# Patient Record
Sex: MALE | Race: WHITE | Employment: UNEMPLOYED | URBAN - METROPOLITAN AREA
[De-identification: names, ages, dates, MRNs, and addresses within clinical notes are randomized per-mention and may not be internally consistent; named-entity substitution may affect disease eponyms.]

---

## 2023-09-12 ENCOUNTER — APPOINTMENT (OUTPATIENT)
Dept: CT IMAGING | Age: 48
DRG: 364 | End: 2023-09-12
Payer: MEDICAID

## 2023-09-12 ENCOUNTER — APPOINTMENT (OUTPATIENT)
Dept: GENERAL RADIOLOGY | Age: 48
DRG: 364 | End: 2023-09-12
Payer: MEDICAID

## 2023-09-12 ENCOUNTER — HOSPITAL ENCOUNTER (EMERGENCY)
Age: 48
Discharge: ANOTHER HOSPITAL | DRG: 364 | End: 2023-09-12
Attending: EMERGENCY MEDICINE
Payer: MEDICAID

## 2023-09-12 ENCOUNTER — ANESTHESIA (OUTPATIENT)
Dept: OPERATING ROOM | Age: 48
End: 2023-09-12
Payer: MEDICAID

## 2023-09-12 ENCOUNTER — ANESTHESIA EVENT (OUTPATIENT)
Dept: OPERATING ROOM | Age: 48
End: 2023-09-12
Payer: MEDICAID

## 2023-09-12 ENCOUNTER — HOSPITAL ENCOUNTER (INPATIENT)
Age: 48
LOS: 10 days | Discharge: HOME | DRG: 364 | End: 2023-09-22
Attending: EMERGENCY MEDICINE | Admitting: INTERNAL MEDICINE
Payer: MEDICAID

## 2023-09-12 VITALS
OXYGEN SATURATION: 98 % | DIASTOLIC BLOOD PRESSURE: 88 MMHG | HEIGHT: 72 IN | WEIGHT: 95 LBS | SYSTOLIC BLOOD PRESSURE: 117 MMHG | BODY MASS INDEX: 12.87 KG/M2 | HEART RATE: 108 BPM | TEMPERATURE: 98.3 F | RESPIRATION RATE: 22 BRPM

## 2023-09-12 DIAGNOSIS — K12.2 SUBMANDIBULAR ABSCESS: ICD-10-CM

## 2023-09-12 DIAGNOSIS — J93.11 PRIMARY SPONTANEOUS PNEUMOTHORAX: Primary | ICD-10-CM

## 2023-09-12 DIAGNOSIS — J93.83 SPONTANEOUS PNEUMOTHORAX: Primary | ICD-10-CM

## 2023-09-12 DIAGNOSIS — F10.930 ALCOHOL WITHDRAWAL SYNDROME WITHOUT COMPLICATION (HCC): ICD-10-CM

## 2023-09-12 DIAGNOSIS — L02.11 NECK ABSCESS: ICD-10-CM

## 2023-09-12 PROBLEM — K02.9 DENTAL CAVITIES: Status: ACTIVE | Noted: 2023-09-12

## 2023-09-12 PROBLEM — I10 HYPERTENSION: Status: ACTIVE | Noted: 2023-09-12

## 2023-09-12 PROBLEM — D72.825 BANDEMIA: Status: ACTIVE | Noted: 2023-09-12

## 2023-09-12 PROBLEM — E87.6 HYPOKALEMIA: Status: ACTIVE | Noted: 2023-09-12

## 2023-09-12 PROBLEM — D72.823 LEUKEMOID REACTION: Status: ACTIVE | Noted: 2023-09-12

## 2023-09-12 PROBLEM — E87.1 HYPONATREMIA: Status: ACTIVE | Noted: 2023-09-12

## 2023-09-12 PROBLEM — K04.7 DENTAL INFECTION: Status: ACTIVE | Noted: 2023-09-12

## 2023-09-12 PROBLEM — F10.20 ALCOHOL DEPENDENCE (HCC): Chronic | Status: ACTIVE | Noted: 2023-09-12

## 2023-09-12 PROBLEM — F41.9 ANXIETY: Chronic | Status: ACTIVE | Noted: 2023-09-12

## 2023-09-12 PROBLEM — J93.9 PNEUMOTHORAX ON RIGHT: Status: ACTIVE | Noted: 2023-09-12

## 2023-09-12 PROBLEM — E87.8 HYPOCHLOREMIA: Status: ACTIVE | Noted: 2023-09-12

## 2023-09-12 PROBLEM — J44.9 COPD (CHRONIC OBSTRUCTIVE PULMONARY DISEASE) (HCC): Chronic | Status: ACTIVE | Noted: 2023-09-12

## 2023-09-12 LAB
ALBUMIN SERPL-MCNC: 3.5 G/DL (ref 3.5–5.2)
ALP SERPL-CCNC: 123 U/L (ref 40–129)
ALT SERPL-CCNC: 11 U/L (ref 5–41)
ANION GAP SERPL CALCULATED.3IONS-SCNC: 13 MMOL/L (ref 9–17)
ANION GAP SERPL CALCULATED.3IONS-SCNC: 7 MMOL/L (ref 9–17)
AST SERPL-CCNC: 20 U/L
BASOPHILS # BLD: 0.07 K/UL (ref 0–0.2)
BASOPHILS NFR BLD: 1 % (ref 0–2)
BILIRUB DIRECT SERPL-MCNC: 0.3 MG/DL
BILIRUB INDIRECT SERPL-MCNC: 0.6 MG/DL (ref 0–1)
BILIRUB SERPL-MCNC: 0.9 MG/DL (ref 0.3–1.2)
BUN SERPL-MCNC: 7 MG/DL (ref 6–20)
BUN SERPL-MCNC: 7 MG/DL (ref 6–20)
BUN/CREAT SERPL: 12 (ref 9–20)
CALCIUM SERPL-MCNC: 8.5 MG/DL (ref 8.6–10.4)
CALCIUM SERPL-MCNC: 9.8 MG/DL (ref 8.6–10.4)
CHLORIDE SERPL-SCNC: 94 MMOL/L (ref 98–107)
CHLORIDE SERPL-SCNC: 99 MMOL/L (ref 98–107)
CO2 SERPL-SCNC: 27 MMOL/L (ref 20–31)
CO2 SERPL-SCNC: 29 MMOL/L (ref 20–31)
CREAT SERPL-MCNC: 0.5 MG/DL (ref 0.7–1.2)
CREAT SERPL-MCNC: 0.6 MG/DL (ref 0.7–1.2)
EOSINOPHIL # BLD: 0.19 K/UL (ref 0–0.44)
EOSINOPHILS RELATIVE PERCENT: 1 % (ref 1–4)
ERYTHROCYTE [DISTWIDTH] IN BLOOD BY AUTOMATED COUNT: 12.3 % (ref 11.8–14.4)
GFR SERPL CREATININE-BSD FRML MDRD: >60 ML/MIN/1.73M2
GFR SERPL CREATININE-BSD FRML MDRD: >60 ML/MIN/1.73M2
GLUCOSE SERPL-MCNC: 135 MG/DL (ref 70–99)
GLUCOSE SERPL-MCNC: 155 MG/DL (ref 70–99)
HCT VFR BLD AUTO: 50.9 % (ref 40.7–50.3)
HGB BLD-MCNC: 17.3 G/DL (ref 13–17)
IMM GRANULOCYTES # BLD AUTO: 0.09 K/UL (ref 0–0.3)
IMM GRANULOCYTES NFR BLD: 1 %
INR PPP: 1
LACTIC ACID, SEPSIS WHOLE BLOOD: 1.3 MMOL/L (ref 0.5–1.9)
LYMPHOCYTES NFR BLD: 1.33 K/UL (ref 1.1–3.7)
LYMPHOCYTES RELATIVE PERCENT: 9 % (ref 24–43)
MCH RBC QN AUTO: 33.5 PG (ref 25.2–33.5)
MCHC RBC AUTO-ENTMCNC: 34 G/DL (ref 28.4–34.8)
MCV RBC AUTO: 98.5 FL (ref 82.6–102.9)
MONOCYTES NFR BLD: 1.15 K/UL (ref 0.1–1.2)
MONOCYTES NFR BLD: 8 % (ref 3–12)
NEUTROPHILS NFR BLD: 80 % (ref 36–65)
NEUTS SEG NFR BLD: 12.53 K/UL (ref 1.5–8.1)
NRBC BLD-RTO: 0 PER 100 WBC
PARTIAL THROMBOPLASTIN TIME: 34.6 SEC (ref 23.9–33.8)
PLATELET # BLD AUTO: 298 K/UL (ref 138–453)
PMV BLD AUTO: 9.2 FL (ref 8.1–13.5)
POTASSIUM SERPL-SCNC: 3.5 MMOL/L (ref 3.7–5.3)
POTASSIUM SERPL-SCNC: 4.2 MMOL/L (ref 3.7–5.3)
PROT SERPL-MCNC: 7.5 G/DL (ref 6.4–8.3)
PROTHROMBIN TIME: 13.2 SEC (ref 11.5–14.2)
RBC # BLD AUTO: 5.17 M/UL (ref 4.21–5.77)
SARS-COV-2 RDRP RESP QL NAA+PROBE: NOT DETECTED
SODIUM SERPL-SCNC: 134 MMOL/L (ref 135–144)
SODIUM SERPL-SCNC: 135 MMOL/L (ref 135–144)
SPECIMEN DESCRIPTION: NORMAL
TROPONIN I SERPL HS-MCNC: 14 NG/L (ref 0–22)
WBC OTHER # BLD: 15.4 K/UL (ref 3.5–11.3)

## 2023-09-12 PROCEDURE — 94761 N-INVAS EAR/PLS OXIMETRY MLT: CPT

## 2023-09-12 PROCEDURE — 96365 THER/PROPH/DIAG IV INF INIT: CPT

## 2023-09-12 PROCEDURE — 71260 CT THORAX DX C+: CPT

## 2023-09-12 PROCEDURE — 7100000001 HC PACU RECOVERY - ADDTL 15 MIN: Performed by: OTOLARYNGOLOGY

## 2023-09-12 PROCEDURE — 83605 ASSAY OF LACTIC ACID: CPT

## 2023-09-12 PROCEDURE — 36415 COLL VENOUS BLD VENIPUNCTURE: CPT

## 2023-09-12 PROCEDURE — 2500000003 HC RX 250 WO HCPCS

## 2023-09-12 PROCEDURE — 87040 BLOOD CULTURE FOR BACTERIA: CPT

## 2023-09-12 PROCEDURE — 6360000002 HC RX W HCPCS: Performed by: EMERGENCY MEDICINE

## 2023-09-12 PROCEDURE — 0W9600Z DRAINAGE OF NECK WITH DRAINAGE DEVICE, OPEN APPROACH: ICD-10-PCS | Performed by: OTOLARYNGOLOGY

## 2023-09-12 PROCEDURE — 85025 COMPLETE CBC W/AUTO DIFF WBC: CPT

## 2023-09-12 PROCEDURE — 21501 I&D DP ABSC/HMTMA SFT TS NCK: CPT | Performed by: OTOLARYNGOLOGY

## 2023-09-12 PROCEDURE — 87076 CULTURE ANAEROBE IDENT EACH: CPT

## 2023-09-12 PROCEDURE — 6360000002 HC RX W HCPCS

## 2023-09-12 PROCEDURE — 70487 CT MAXILLOFACIAL W/DYE: CPT

## 2023-09-12 PROCEDURE — 2580000003 HC RX 258

## 2023-09-12 PROCEDURE — 99285 EMERGENCY DEPT VISIT HI MDM: CPT

## 2023-09-12 PROCEDURE — 2580000003 HC RX 258: Performed by: OTOLARYNGOLOGY

## 2023-09-12 PROCEDURE — 99291 CRITICAL CARE FIRST HOUR: CPT | Performed by: INTERNAL MEDICINE

## 2023-09-12 PROCEDURE — 87185 SC STD ENZYME DETCJ PER NZM: CPT

## 2023-09-12 PROCEDURE — 2700000000 HC OXYGEN THERAPY PER DAY

## 2023-09-12 PROCEDURE — 2580000003 HC RX 258: Performed by: STUDENT IN AN ORGANIZED HEALTH CARE EDUCATION/TRAINING PROGRAM

## 2023-09-12 PROCEDURE — 3600000002 HC SURGERY LEVEL 2 BASE: Performed by: OTOLARYNGOLOGY

## 2023-09-12 PROCEDURE — 2580000003 HC RX 258: Performed by: EMERGENCY MEDICINE

## 2023-09-12 PROCEDURE — 3600000012 HC SURGERY LEVEL 2 ADDTL 15MIN: Performed by: OTOLARYNGOLOGY

## 2023-09-12 PROCEDURE — 87635 SARS-COV-2 COVID-19 AMP PRB: CPT

## 2023-09-12 PROCEDURE — 85610 PROTHROMBIN TIME: CPT

## 2023-09-12 PROCEDURE — 32551 INSERTION OF CHEST TUBE: CPT

## 2023-09-12 PROCEDURE — 87641 MR-STAPH DNA AMP PROBE: CPT

## 2023-09-12 PROCEDURE — 2000000000 HC ICU R&B

## 2023-09-12 PROCEDURE — 2709999900 HC NON-CHARGEABLE SUPPLY: Performed by: OTOLARYNGOLOGY

## 2023-09-12 PROCEDURE — 87075 CULTR BACTERIA EXCEPT BLOOD: CPT

## 2023-09-12 PROCEDURE — 94640 AIRWAY INHALATION TREATMENT: CPT

## 2023-09-12 PROCEDURE — 6360000004 HC RX CONTRAST MEDICATION: Performed by: EMERGENCY MEDICINE

## 2023-09-12 PROCEDURE — 87070 CULTURE OTHR SPECIMN AEROBIC: CPT

## 2023-09-12 PROCEDURE — 85730 THROMBOPLASTIN TIME PARTIAL: CPT

## 2023-09-12 PROCEDURE — 3700000001 HC ADD 15 MINUTES (ANESTHESIA): Performed by: OTOLARYNGOLOGY

## 2023-09-12 PROCEDURE — 93005 ELECTROCARDIOGRAM TRACING: CPT | Performed by: EMERGENCY MEDICINE

## 2023-09-12 PROCEDURE — 84484 ASSAY OF TROPONIN QUANT: CPT

## 2023-09-12 PROCEDURE — 93005 ELECTROCARDIOGRAM TRACING: CPT | Performed by: STUDENT IN AN ORGANIZED HEALTH CARE EDUCATION/TRAINING PROGRAM

## 2023-09-12 PROCEDURE — 80048 BASIC METABOLIC PNL TOTAL CA: CPT

## 2023-09-12 PROCEDURE — 96375 TX/PRO/DX INJ NEW DRUG ADDON: CPT

## 2023-09-12 PROCEDURE — 96376 TX/PRO/DX INJ SAME DRUG ADON: CPT

## 2023-09-12 PROCEDURE — 99254 IP/OBS CNSLTJ NEW/EST MOD 60: CPT | Performed by: OTOLARYNGOLOGY

## 2023-09-12 PROCEDURE — 96361 HYDRATE IV INFUSION ADD-ON: CPT

## 2023-09-12 PROCEDURE — 7100000000 HC PACU RECOVERY - FIRST 15 MIN: Performed by: OTOLARYNGOLOGY

## 2023-09-12 PROCEDURE — 6370000000 HC RX 637 (ALT 250 FOR IP)

## 2023-09-12 PROCEDURE — 71045 X-RAY EXAM CHEST 1 VIEW: CPT

## 2023-09-12 PROCEDURE — 6360000002 HC RX W HCPCS: Performed by: STUDENT IN AN ORGANIZED HEALTH CARE EDUCATION/TRAINING PROGRAM

## 2023-09-12 PROCEDURE — 87205 SMEAR GRAM STAIN: CPT

## 2023-09-12 PROCEDURE — 3700000000 HC ANESTHESIA ATTENDED CARE: Performed by: OTOLARYNGOLOGY

## 2023-09-12 PROCEDURE — 80076 HEPATIC FUNCTION PANEL: CPT

## 2023-09-12 PROCEDURE — 2500000003 HC RX 250 WO HCPCS: Performed by: STUDENT IN AN ORGANIZED HEALTH CARE EDUCATION/TRAINING PROGRAM

## 2023-09-12 PROCEDURE — 96367 TX/PROPH/DG ADDL SEQ IV INF: CPT

## 2023-09-12 RX ORDER — SODIUM CHLORIDE 9 MG/ML
INJECTION, SOLUTION INTRAVENOUS CONTINUOUS
Status: DISCONTINUED | OUTPATIENT
Start: 2023-09-12 | End: 2023-09-13

## 2023-09-12 RX ORDER — ONDANSETRON 2 MG/ML
4 INJECTION INTRAMUSCULAR; INTRAVENOUS EVERY 6 HOURS PRN
Status: DISCONTINUED | OUTPATIENT
Start: 2023-09-12 | End: 2023-09-22 | Stop reason: HOSPADM

## 2023-09-12 RX ORDER — SODIUM CHLORIDE 0.9 % (FLUSH) 0.9 %
10 SYRINGE (ML) INJECTION PRN
Status: DISCONTINUED | OUTPATIENT
Start: 2023-09-12 | End: 2023-09-12 | Stop reason: HOSPADM

## 2023-09-12 RX ORDER — SODIUM CHLORIDE 0.9 % (FLUSH) 0.9 %
5-40 SYRINGE (ML) INJECTION PRN
Status: DISCONTINUED | OUTPATIENT
Start: 2023-09-12 | End: 2023-09-22 | Stop reason: HOSPADM

## 2023-09-12 RX ORDER — SODIUM CHLORIDE 9 MG/ML
INJECTION, SOLUTION INTRAVENOUS CONTINUOUS PRN
Status: DISCONTINUED | OUTPATIENT
Start: 2023-09-12 | End: 2023-09-12 | Stop reason: SDUPTHER

## 2023-09-12 RX ORDER — METHYLPREDNISOLONE SODIUM SUCCINATE 125 MG/2ML
125 INJECTION, POWDER, LYOPHILIZED, FOR SOLUTION INTRAMUSCULAR; INTRAVENOUS ONCE
Status: COMPLETED | OUTPATIENT
Start: 2023-09-12 | End: 2023-09-12

## 2023-09-12 RX ORDER — ONDANSETRON 2 MG/ML
4 INJECTION INTRAMUSCULAR; INTRAVENOUS
Status: ACTIVE | OUTPATIENT
Start: 2023-09-12 | End: 2023-09-13

## 2023-09-12 RX ORDER — FENTANYL CITRATE 0.05 MG/ML
50 INJECTION, SOLUTION INTRAMUSCULAR; INTRAVENOUS ONCE
Status: COMPLETED | OUTPATIENT
Start: 2023-09-12 | End: 2023-09-12

## 2023-09-12 RX ORDER — IPRATROPIUM BROMIDE AND ALBUTEROL SULFATE 2.5; .5 MG/3ML; MG/3ML
1 SOLUTION RESPIRATORY (INHALATION)
Status: DISCONTINUED | OUTPATIENT
Start: 2023-09-12 | End: 2023-09-17

## 2023-09-12 RX ORDER — 0.9 % SODIUM CHLORIDE 0.9 %
1000 INTRAVENOUS SOLUTION INTRAVENOUS ONCE
Status: COMPLETED | OUTPATIENT
Start: 2023-09-12 | End: 2023-09-12

## 2023-09-12 RX ORDER — LORAZEPAM 2 MG/ML
1 INJECTION INTRAMUSCULAR ONCE
Status: COMPLETED | OUTPATIENT
Start: 2023-09-12 | End: 2023-09-12

## 2023-09-12 RX ORDER — RISPERIDONE 0.25 MG/1
TABLET ORAL 2 TIMES DAILY
COMMUNITY

## 2023-09-12 RX ORDER — TRAZODONE HYDROCHLORIDE 100 MG/1
TABLET ORAL NIGHTLY
COMMUNITY

## 2023-09-12 RX ORDER — LIDOCAINE HYDROCHLORIDE 10 MG/ML
INJECTION, SOLUTION EPIDURAL; INFILTRATION; INTRACAUDAL; PERINEURAL PRN
Status: DISCONTINUED | OUTPATIENT
Start: 2023-09-12 | End: 2023-09-12 | Stop reason: SDUPTHER

## 2023-09-12 RX ORDER — SODIUM CHLORIDE 9 MG/ML
INJECTION, SOLUTION INTRAVENOUS PRN
Status: DISCONTINUED | OUTPATIENT
Start: 2023-09-12 | End: 2023-09-22 | Stop reason: HOSPADM

## 2023-09-12 RX ORDER — 0.9 % SODIUM CHLORIDE 0.9 %
80 INTRAVENOUS SOLUTION INTRAVENOUS ONCE
Status: COMPLETED | OUTPATIENT
Start: 2023-09-12 | End: 2023-09-12

## 2023-09-12 RX ORDER — MIDAZOLAM HYDROCHLORIDE 1 MG/ML
INJECTION INTRAMUSCULAR; INTRAVENOUS PRN
Status: DISCONTINUED | OUTPATIENT
Start: 2023-09-12 | End: 2023-09-12 | Stop reason: SDUPTHER

## 2023-09-12 RX ORDER — POLYETHYLENE GLYCOL 3350 17 G/17G
17 POWDER, FOR SOLUTION ORAL DAILY PRN
Status: DISCONTINUED | OUTPATIENT
Start: 2023-09-12 | End: 2023-09-22 | Stop reason: HOSPADM

## 2023-09-12 RX ORDER — ACETAMINOPHEN 650 MG/1
650 SUPPOSITORY RECTAL EVERY 6 HOURS PRN
Status: DISCONTINUED | OUTPATIENT
Start: 2023-09-12 | End: 2023-09-22 | Stop reason: HOSPADM

## 2023-09-12 RX ORDER — DEXAMETHASONE SODIUM PHOSPHATE 10 MG/ML
INJECTION INTRAMUSCULAR; INTRAVENOUS PRN
Status: DISCONTINUED | OUTPATIENT
Start: 2023-09-12 | End: 2023-09-12 | Stop reason: SDUPTHER

## 2023-09-12 RX ORDER — MAGNESIUM SULFATE IN WATER 40 MG/ML
2000 INJECTION, SOLUTION INTRAVENOUS PRN
Status: DISCONTINUED | OUTPATIENT
Start: 2023-09-12 | End: 2023-09-22 | Stop reason: HOSPADM

## 2023-09-12 RX ORDER — METRONIDAZOLE 500 MG/100ML
500 INJECTION, SOLUTION INTRAVENOUS ONCE
Status: COMPLETED | OUTPATIENT
Start: 2023-09-12 | End: 2023-09-12

## 2023-09-12 RX ORDER — POTASSIUM CHLORIDE 29.8 MG/ML
20 INJECTION INTRAVENOUS PRN
Status: DISCONTINUED | OUTPATIENT
Start: 2023-09-12 | End: 2023-09-14

## 2023-09-12 RX ORDER — MAGNESIUM HYDROXIDE 1200 MG/15ML
LIQUID ORAL CONTINUOUS PRN
Status: COMPLETED | OUTPATIENT
Start: 2023-09-12 | End: 2023-09-12

## 2023-09-12 RX ORDER — SODIUM CHLORIDE 0.9 % (FLUSH) 0.9 %
5-40 SYRINGE (ML) INJECTION EVERY 12 HOURS SCHEDULED
Status: DISCONTINUED | OUTPATIENT
Start: 2023-09-12 | End: 2023-09-22 | Stop reason: HOSPADM

## 2023-09-12 RX ORDER — MORPHINE SULFATE 4 MG/ML
2 INJECTION, SOLUTION INTRAMUSCULAR; INTRAVENOUS EVERY 8 HOURS PRN
Status: DISCONTINUED | OUTPATIENT
Start: 2023-09-12 | End: 2023-09-22 | Stop reason: HOSPADM

## 2023-09-12 RX ORDER — CLINDAMYCIN PHOSPHATE 600 MG/50ML
600 INJECTION INTRAVENOUS ONCE
Status: COMPLETED | OUTPATIENT
Start: 2023-09-12 | End: 2023-09-12

## 2023-09-12 RX ORDER — FENTANYL CITRATE 50 UG/ML
INJECTION, SOLUTION INTRAMUSCULAR; INTRAVENOUS PRN
Status: DISCONTINUED | OUTPATIENT
Start: 2023-09-12 | End: 2023-09-12 | Stop reason: SDUPTHER

## 2023-09-12 RX ORDER — ACETAMINOPHEN 325 MG/1
650 TABLET ORAL EVERY 6 HOURS PRN
Status: DISCONTINUED | OUTPATIENT
Start: 2023-09-12 | End: 2023-09-22 | Stop reason: HOSPADM

## 2023-09-12 RX ORDER — HYDRALAZINE HYDROCHLORIDE 20 MG/ML
10 INJECTION INTRAMUSCULAR; INTRAVENOUS
Status: DISCONTINUED | OUTPATIENT
Start: 2023-09-12 | End: 2023-09-22 | Stop reason: HOSPADM

## 2023-09-12 RX ORDER — IPRATROPIUM BROMIDE AND ALBUTEROL SULFATE 2.5; .5 MG/3ML; MG/3ML
1 SOLUTION RESPIRATORY (INHALATION) ONCE
Status: DISCONTINUED | OUTPATIENT
Start: 2023-09-12 | End: 2023-09-12

## 2023-09-12 RX ORDER — ONDANSETRON 2 MG/ML
INJECTION INTRAMUSCULAR; INTRAVENOUS PRN
Status: DISCONTINUED | OUTPATIENT
Start: 2023-09-12 | End: 2023-09-12 | Stop reason: SDUPTHER

## 2023-09-12 RX ORDER — ONDANSETRON 4 MG/1
4 TABLET, ORALLY DISINTEGRATING ORAL EVERY 8 HOURS PRN
Status: DISCONTINUED | OUTPATIENT
Start: 2023-09-12 | End: 2023-09-22 | Stop reason: HOSPADM

## 2023-09-12 RX ORDER — POTASSIUM CHLORIDE 7.45 MG/ML
10 INJECTION INTRAVENOUS PRN
Status: DISCONTINUED | OUTPATIENT
Start: 2023-09-12 | End: 2023-09-14

## 2023-09-12 RX ORDER — PROPOFOL 10 MG/ML
INJECTION, EMULSION INTRAVENOUS PRN
Status: DISCONTINUED | OUTPATIENT
Start: 2023-09-12 | End: 2023-09-12 | Stop reason: SDUPTHER

## 2023-09-12 RX ADMIN — MIDAZOLAM 2 MG: 1 INJECTION INTRAMUSCULAR; INTRAVENOUS at 14:18

## 2023-09-12 RX ADMIN — THIAMINE HYDROCHLORIDE: 100 INJECTION, SOLUTION INTRAMUSCULAR; INTRAVENOUS at 13:47

## 2023-09-12 RX ADMIN — CEFTRIAXONE SODIUM 1000 MG: 1 INJECTION, POWDER, FOR SOLUTION INTRAMUSCULAR; INTRAVENOUS at 07:27

## 2023-09-12 RX ADMIN — SODIUM CHLORIDE: 9 INJECTION, SOLUTION INTRAVENOUS at 19:09

## 2023-09-12 RX ADMIN — LORAZEPAM 1 MG: 2 INJECTION INTRAMUSCULAR; INTRAVENOUS at 07:32

## 2023-09-12 RX ADMIN — FENTANYL CITRATE 25 MCG: 50 INJECTION, SOLUTION INTRAMUSCULAR; INTRAVENOUS at 14:40

## 2023-09-12 RX ADMIN — IPRATROPIUM BROMIDE AND ALBUTEROL SULFATE 1 DOSE: 2.5; .5 SOLUTION RESPIRATORY (INHALATION) at 20:28

## 2023-09-12 RX ADMIN — PROPOFOL 50 MG: 10 INJECTION, EMULSION INTRAVENOUS at 14:23

## 2023-09-12 RX ADMIN — CEFEPIME 2000 MG: 2 INJECTION, POWDER, FOR SOLUTION INTRAVENOUS at 06:19

## 2023-09-12 RX ADMIN — FENTANYL CITRATE 25 MCG: 50 INJECTION, SOLUTION INTRAMUSCULAR; INTRAVENOUS at 14:30

## 2023-09-12 RX ADMIN — DEXAMETHASONE SODIUM PHOSPHATE 10 MG: 10 INJECTION INTRAMUSCULAR; INTRAVENOUS at 14:28

## 2023-09-12 RX ADMIN — IOPAMIDOL 75 ML: 755 INJECTION, SOLUTION INTRAVENOUS at 05:51

## 2023-09-12 RX ADMIN — ONDANSETRON 4 MG: 2 INJECTION INTRAMUSCULAR; INTRAVENOUS at 14:28

## 2023-09-12 RX ADMIN — SODIUM CHLORIDE: 9 INJECTION, SOLUTION INTRAVENOUS at 18:50

## 2023-09-12 RX ADMIN — FENTANYL CITRATE 50 MCG: 50 INJECTION INTRAMUSCULAR; INTRAVENOUS at 06:31

## 2023-09-12 RX ADMIN — SODIUM CHLORIDE 1000 ML: 9 INJECTION, SOLUTION INTRAVENOUS at 05:23

## 2023-09-12 RX ADMIN — SODIUM CHLORIDE 1000 ML: 9 INJECTION, SOLUTION INTRAVENOUS at 11:49

## 2023-09-12 RX ADMIN — SODIUM CHLORIDE 80 ML: 9 INJECTION, SOLUTION INTRAVENOUS at 05:50

## 2023-09-12 RX ADMIN — METHYLPREDNISOLONE SODIUM SUCCINATE 125 MG: 125 INJECTION, POWDER, LYOPHILIZED, FOR SOLUTION INTRAMUSCULAR; INTRAVENOUS at 04:09

## 2023-09-12 RX ADMIN — LIDOCAINE HYDROCHLORIDE 50 MG: 10 INJECTION, SOLUTION EPIDURAL; INFILTRATION; INTRACAUDAL; PERINEURAL at 14:23

## 2023-09-12 RX ADMIN — CLINDAMYCIN IN 5 PERCENT DEXTROSE 600 MG: 12 INJECTION, SOLUTION INTRAVENOUS at 11:51

## 2023-09-12 RX ADMIN — AMPICILLIN SODIUM AND SULBACTAM SODIUM 3000 MG: 2; 1 INJECTION, POWDER, FOR SOLUTION INTRAMUSCULAR; INTRAVENOUS at 19:11

## 2023-09-12 RX ADMIN — VANCOMYCIN HYDROCHLORIDE 1250 MG: 1.25 INJECTION, POWDER, LYOPHILIZED, FOR SOLUTION INTRAVENOUS at 20:41

## 2023-09-12 RX ADMIN — FENTANYL CITRATE 50 MCG: 50 INJECTION INTRAMUSCULAR; INTRAVENOUS at 04:55

## 2023-09-12 RX ADMIN — SODIUM CHLORIDE: 0.9 INJECTION, SOLUTION INTRAVENOUS at 14:16

## 2023-09-12 RX ADMIN — SODIUM CHLORIDE, PRESERVATIVE FREE 10 ML: 5 INJECTION INTRAVENOUS at 05:51

## 2023-09-12 RX ADMIN — METRONIDAZOLE 500 MG: 500 INJECTION, SOLUTION INTRAVENOUS at 08:19

## 2023-09-12 ASSESSMENT — PAIN DESCRIPTION - DESCRIPTORS
DESCRIPTORS: DISCOMFORT
DESCRIPTORS: ACHING
DESCRIPTORS: DISCOMFORT
DESCRIPTORS: ACHING

## 2023-09-12 ASSESSMENT — ENCOUNTER SYMPTOMS
SORE THROAT: 1
VOICE CHANGE: 0
TROUBLE SWALLOWING: 0
ABDOMINAL PAIN: 0
CHEST TIGHTNESS: 1
FACIAL SWELLING: 1
COUGH: 1
STRIDOR: 0
NAUSEA: 1
WHEEZING: 1
NAUSEA: 0
DIARRHEA: 0
VOMITING: 0
COLOR CHANGE: 1
SHORTNESS OF BREATH: 1
COUGH: 1
FACIAL SWELLING: 1
SHORTNESS OF BREATH: 1
WHEEZING: 0

## 2023-09-12 ASSESSMENT — PAIN DESCRIPTION - PAIN TYPE
TYPE: ACUTE PAIN
TYPE: SURGICAL PAIN
TYPE: SURGICAL PAIN
TYPE: ACUTE PAIN

## 2023-09-12 ASSESSMENT — PAIN DESCRIPTION - LOCATION
LOCATION: CHEST;ABDOMEN
LOCATION: NECK
LOCATION: CHEST
LOCATION: CHEST;ABDOMEN
LOCATION: CHEST
LOCATION: NECK

## 2023-09-12 ASSESSMENT — PAIN SCALES - GENERAL
PAINLEVEL_OUTOF10: 4
PAINLEVEL_OUTOF10: 4
PAINLEVEL_OUTOF10: 6
PAINLEVEL_OUTOF10: 4
PAINLEVEL_OUTOF10: 4
PAINLEVEL_OUTOF10: 6
PAINLEVEL_OUTOF10: 0
PAINLEVEL_OUTOF10: 6
PAINLEVEL_OUTOF10: 8

## 2023-09-12 ASSESSMENT — PAIN DESCRIPTION - ORIENTATION
ORIENTATION: ANTERIOR
ORIENTATION: RIGHT;LEFT
ORIENTATION: RIGHT;LEFT
ORIENTATION: ANTERIOR

## 2023-09-12 ASSESSMENT — PAIN SCALES - WONG BAKER
WONGBAKER_NUMERICALRESPONSE: 0

## 2023-09-12 ASSESSMENT — PAIN - FUNCTIONAL ASSESSMENT
PAIN_FUNCTIONAL_ASSESSMENT: PREVENTS OR INTERFERES SOME ACTIVE ACTIVITIES AND ADLS
PAIN_FUNCTIONAL_ASSESSMENT: PREVENTS OR INTERFERES SOME ACTIVE ACTIVITIES AND ADLS
PAIN_FUNCTIONAL_ASSESSMENT: 0-10
PAIN_FUNCTIONAL_ASSESSMENT: 0-10

## 2023-09-12 ASSESSMENT — LIFESTYLE VARIABLES: SMOKING_STATUS: 1

## 2023-09-12 ASSESSMENT — PAIN DESCRIPTION - FREQUENCY
FREQUENCY: CONTINUOUS
FREQUENCY: CONTINUOUS

## 2023-09-12 NOTE — ED NOTES
Caesar San  [birth year]  50 yo M  SOB - PTX R side  Smoker, daily drinker  Chest Tube placed  Cefepime, ativan  Also L sided submandibular abscess, asking for ENT     Post Angela, RN  09/12/23 4841

## 2023-09-12 NOTE — H&P
Critical Care - History and Physical Examination    Patient's name:  Abraham Feliz Record Number: [id number]  Patient's account/billing number: [de-identified]  Patient's YOB: 1975  Age: 51 y.o. Date of Admission: 9/12/2023 10:56 AM  Reason of ICU admission:   Date of History and Physical Examination: 9/12/2023      Primary Care Physician: No primary care provider on file. Attending Physician:    Code Status: No Order    Chief complaint: Abrupt onset of SOB     Reason for ICU admission:   B/l Submandibular abscess and pneumothorax     History Of Present Illness:   History was obtained from the patient. Clay Enrique is a 51 y.o. with a PMH of COPD and anxiety. Patient presents with an abrupt onset of shortness of breath. He does have a significant history of 30 years of smoking 1/2 packs for 15 of those years. He has smoked 2 backs for the later 15 years. Patient denies recent trauma. He does have COPD for which he does take inhalers, but couldn't remember the names. States that he has had left sided swelling, tenderness and erythema under the left jaw the past two days. He was concerned about this swelling because his father had head and neck cancer diagnosed in the past. He was worried about this presentation because it seemed similar to his father. Says he does not brush his teeth often    Patient also has a history of anxiety for which he takes trazodone. Has a chronic history of alcohol abuse. States he drinks a max of 4 beers nightly. ED course  Patient initially presented to [address] [place] [address]. He was found to have extreme shortness of breath and decreased breath sounds on the right. A chest xray revealed a right sided pneumothorax. Chest tube was placed and the patient was given a non-breather mask. Also, had a CT head completed for left sided neck swelling. B/l submandibular abscesses noted on CT head. Patient was transferred to [facility] for ENT evaluation.  [facility]

## 2023-09-12 NOTE — OP NOTE
Operative Note      Patient: Jill Mckeon  YOB: 1975  MRN: [id number]    Date of Procedure: 9/12/2023    Pre-Op Diagnosis Codes:     * Neck abscess [L02.11]-BILATERAL FROM ODONTOGENIC SOURCE    Post-Op Diagnosis: Same       Procedure(s): I AND D NECK ABSCESS, DEEP NECK, BILATERAL    Surgeon(s):  Clementeen Spurling, MD    Assistant: None    Anesthesia: General    Estimated Blood Loss (mL): Minimal    Complications: none    Specimens:   ID Type Source Tests Collected by Time Destination   1 : NECK ABCESS (LEFT) Swab Neck CULTURE, ANAEROBIC AND AEROBIC Clementeen Spurling, MD 9/12/2023 1430    Implants: none      Drains:  Penrose drain x 2--one each side    Open Drain 09/12/23 Anterior; Left Neck (Active)   Site Description Intact 09/12/23 1730   Dressing Status Intact; New drainage noted 09/12/23 1730   Drainage Appearance Serosanguinous 09/12/23 1730   Status Unclamped 09/12/23 1730       Open Drain 09/12/23 Right Neck (Active)   Site Description Intact 09/12/23 1730   Dressing Status Intact; New drainage noted 09/12/23 1730   Drainage Appearance Serosanguinous 09/12/23 1730   Status Unclamped 09/12/23 1730     Findings: + L>R neck abscess; cultured      Detailed Description of Procedure:   General endotracheal anesthesia was administered by Anesthesia team. Both sides of the neck were prepped with betadine. The patient was draped in usual manner. A 2 cm incision was made in the left neck. Blunt dissection was carried out into the submandibular space and the abscess was drained. The cavity was explored with blunt dissection only. Cultures were taken. The cavity was irrigated with saline. The same procedure was done on the right side of neck and purulence was also drained. A penrose drain was placed in both the right and left neck, secured with a nylon suture. A dressing was placed.     Plan:  IV ABX  Await culture results  Dental care needed, follow-up with Dentist/Oral surgeon        Electronically signed by Lissa Camarillo MD on 9/12/2023 at 7:52 PM

## 2023-09-12 NOTE — ED PROVIDER NOTES
metronidazole (FLAGYL) 500 mg in 0.9% NaCl 100 mL IVPB premix     Order Specific Question:   Antimicrobial Indications     Answer:   Head and Neck Infection    LORazepam (ATIVAN) injection 1 mg       CONSULTS:  IP CONSULT TO HOSPITALIST    CRITICAL CARE:  There was significant risk of life threatening deterioration of patient's condition requiring my direct management. Critical care time 25 minutes, excluding any documented procedures. FINAL IMPRESSION      1. Spontaneous pneumothorax    2. Submandibular abscess    3. Alcohol withdrawal syndrome without complication ([address])          DISPOSITION / PLAN     DISPOSITION Decision To Transfer 09/12/2023 07:21:57 AM      PATIENT REFERRED TO:  No follow-up provider specified. DISCHARGE MEDICATIONS:  There are no discharge medications for this patient.       Reyna Combs DO  Emergency Medicine Physician    (Please note that portions of thisnote were completed with a voice recognition program.  Efforts were made to edit the dictations but occasionally words are mis-transcribed.)        Reyna Combs DO  09/12/23 3051

## 2023-09-12 NOTE — ED NOTES
Pt came into ED complaining of shortness of breath, and large abscess on left side of jaw/chin. Pt has a history of COPD. Pt was 88 on arrival and heart rate of 144. Pt breathing labored, diminished breath sounds on right, non productive cough. Pt denies fever, chest pain, or vision changes. Pt is alert and orientedx4. Pt states he smokes a pack of cigarettes a day. Pt put on 4L NC. Pt brother at bedside. Will continue to monitor.      WILLIAN THOMASON [facility], RN  09/12/23 2794

## 2023-09-12 NOTE — ED PROVIDER NOTES
EMERGENCY DEPARTMENT ENCOUNTER    Pt Name: Joe Christianson  MRN: [id number]  [address] 1975  Date of evaluation: 9/12/23      Patient care signed out from Dr. Bernie Hernandez. 58-year-old male, history of hypertension, dependency, transferring to [place] for spontaneous pneumothorax status post chest tube, left submandibular abscess, alcohol withdrawal.    3)  Treatment and Disposition    Patient repeat assessment: Stable, on nonrebreather, started on cefepime, Flagyl. Tachycardia improved with Ativan 1 mg IV. Disposition discussion with patient/family: Patient aware and agrees with disposition plan. Case discussed with consulting clinician:   -Attending [facility], Dr. Zach Brown. -ENT [employer], Dr. Kelsey Duffy, states ideal care would be maxillofacial surgeon performing abscess drainage and tooth extraction.    -[facility].  Does not have Maxillofacial coverage.    -[facility], maxillofacial group, [address] [facility], does not provide coverage for [employer] or [facility]. -[facility],Maxillofacial surgery group covers fractures only, no longer drains abscesses/toothe extractions.    -, Maxillofacial surgeon, Dr. Tre Norwood, getting inundated with transfers for dental abscesses and tooth extractions, commence ENT drainage and have patient follow-up as outpatient for tooth extraction. -ENT [employer], Dr. Kelsey Duffy, 2nd conversation, advised no local coverage for tooth extraction/abscess drainage,Dr. Kelsey Duffy understands situation and will drain abscess while patient is admitted at [facility]. MIPS:  N/A    Social determinants of health impacting treatment or disposition:  None    Shared Decision Making completed with patient regarding risks and benefits of admission versus discharge.   Patient decides to be answered to [place]    Code Status Discussion:  Not discussed    \"ED Course\" Notes From Epic Narrator:  ED Course as of 09/12/23 1018   Tue Sep suggestive of dental root abscesses involving the left mandibular   2nd molar dental root and right mandibular 3rd molar dental root and these   are likely to be the sources for submandibular abscess. Bilateral paranasal sinuses are clear. XR CHEST PORTABLE   Final Result   Evidence right chest tube of small diameter extend into lateral aspect right   upper lung. There is re-expansion of right lung. There appears to be a tiny   left apical pneumothorax. Evidence of advanced COPD with moderate to marked hyperinflation of lungs and   right perihilar pulmonary bulla. XR CHEST 1 VIEW   Final Result   1. Large right-sided pneumothorax with tension. I called to discuss this   with the ordering physician at 5 a.m. on 09/12/2023 to the emergency   department, and was informed he was aware and placing a chest tube at the   time of this phone call. LABS: Lab orders shown below, the results are reviewed by myself, and all abnormals are listed below.   Labs Reviewed   CBC WITH AUTO DIFFERENTIAL - Abnormal; Notable for the following components:       Result Value    WBC 15.4 (*)     Hemoglobin 17.3 (*)     Hematocrit 50.9 (*)     Neutrophils % 80 (*)     Lymphocytes % 9 (*)     Immature Granulocytes 1 (*)     Neutrophils Absolute 12.53 (*)     All other components within normal limits   BASIC METABOLIC PANEL - Abnormal; Notable for the following components:    Sodium 134 (*)     Potassium 3.5 (*)     Chloride 94 (*)     Glucose 135 (*)     Creatinine 0.6 (*)     All other components within normal limits   HEPATIC FUNCTION PANEL - Abnormal; Notable for the following components:    Bilirubin, Direct 0.3 (*)     All other components within normal limits   APTT - Abnormal; Notable for the following components:    PTT 34.6 (*)     All other components within normal limits   COVID-19, RAPID   CULTURE, BLOOD 1   CULTURE, BLOOD 2   TROPONIN   PROTIME-INR       Vitals Reviewed:    Vitals:

## 2023-09-12 NOTE — PLAN OF CARE
Problem: Discharge Planning  Goal: Discharge to home or other facility with appropriate resources  Outcome: Progressing  Flowsheets (Taken 9/12/2023 7919)  Discharge to home or other facility with appropriate resources: Identify discharge learning needs (meds, wound care, etc)     Problem: Pain  Goal: Verbalizes/displays adequate comfort level or baseline comfort level  Outcome: Progressing     Problem: Skin/Tissue Integrity  Goal: Absence of new skin breakdown  Description: 1. Monitor for areas of redness and/or skin breakdown  2. Assess vascular access sites hourly  3. Every 4-6 hours minimum:  Change oxygen saturation probe site  4. Every 4-6 hours:  If on nasal continuous positive airway pressure, respiratory therapy assess nares and determine need for appliance change or resting period.   Outcome: Progressing     Problem: Safety - Adult  Goal: Free from fall injury  Outcome: Progressing     Problem: ABCDS Injury Assessment  Goal: Absence of physical injury  Outcome: Progressing

## 2023-09-12 NOTE — ED NOTES
Report called to MICU nurse,  Pt transported to [facility] with writer on monitor      Narda Bailey RN  09/12/23 1503

## 2023-09-12 NOTE — ED PROVIDER NOTES
[address] [place] [address] ED  Emergency Department Encounter  Emergency Medicine Resident     Pt Name:Des Almodovar  MRN: [id number]  [address] [facility] 1975  Date of evaluation: 9/12/23  PCP:  No primary care provider on file. Note Started: 10:59 AM EDT      CHIEF COMPLAINT       Chief Complaint   Patient presents with    Shortness of Breath       HISTORY OF PRESENT ILLNESS  (Location/Symptom, Timing/Onset, Context/Setting, Quality, Duration, Modifying Factors, Severity.)      Dimas Manriquez is a 51 y.o. male past medical history of COPD, tobacco smoker, alcohol abuse who presents as a transfer from [facility] for a right-sided pneumothorax, and submandibular swelling. Initially presented to the ER due to shortness of breath and facial pain and abscess. Found to have bilateral submandibular abscesses on CT imaging. ENT eventually accepted as we have no maxillofacial coverage in the local area. Also found to have 3.4 cm pneumothorax to right side. Chest tube was placed. Patient desats to 80% off nonrebreather. CT chest shows multiple bullae. Sepsis work-up initiated at [facility]. Labs remarkable for potassium 3.5  Received cefepime, ceftriaxone, and flagyl, solu medrol  CT chest was performed after chest tube placement, adequately placed. Chest tube to suction    PAST MEDICAL / SURGICAL / SOCIAL / FAMILY HISTORY      has a past medical history of COPD (chronic obstructive pulmonary disease) ([address]) and Hypertension. has a past surgical history that includes Incision and Drainage of Neck Abscess (09/12/2023). reviewed with patient and none reported.      Social History     Socioeconomic History    Marital status: Single     Spouse name: Not on file    Number of children: Not on file    Years of education: Not on file    Highest education level: Not on file   Occupational History    Not on file   Tobacco Use    Smoking status: Every Day     Packs/day: .5     Types: Cigarettes    Smokeless tobacco: Never time 30 minutes, excluding any documented procedures. FINAL IMPRESSION      1. Primary spontaneous pneumothorax    2. Submandibular abscess    3. Neck abscess          DISPOSITION / PLAN     DISPOSITION Admitted 09/12/2023 01:22:40 PM      PATIENT REFERRED TO:  No follow-up provider specified.     DISCHARGE MEDICATIONS:  New Prescriptions    No medications on file       Alba Andrade MD  Emergency Medicine Resident    (Please note that portions of thisnote were completed with a voice recognition program.  Efforts were made to edit the dictations but occasionally words are mis-transcribed.)        Alba Andrade MD  Resident  09/12/23 4292

## 2023-09-12 NOTE — ED NOTES
Pt arrived to ED as a tx from [address] [facility]. Pt arrived on nonbreather, with noticeable mess on the left side of face/neck.   Pt was found PTA to have a pneuo on the right side and had a chest tube placed PTA.    VSS, RR equal and non labored, NAD, pt is alert and oriented x4    Call light within reach, pt changed into gown    Following plan of care         Kirk Zaidi RN  09/12/23 1060

## 2023-09-13 ENCOUNTER — APPOINTMENT (OUTPATIENT)
Dept: GENERAL RADIOLOGY | Age: 48
DRG: 364 | End: 2023-09-13
Payer: MEDICAID

## 2023-09-13 LAB
ALBUMIN SERPL-MCNC: 2.2 G/DL (ref 3.5–5.2)
ALBUMIN/GLOB SERPL: 0.8 {RATIO} (ref 1–2.5)
ALP SERPL-CCNC: 75 U/L (ref 40–129)
ALT SERPL-CCNC: 7 U/L (ref 5–41)
ANION GAP SERPL CALCULATED.3IONS-SCNC: 8 MMOL/L (ref 9–17)
AST SERPL-CCNC: 13 U/L
BASOPHILS # BLD: <0.03 K/UL (ref 0–0.2)
BASOPHILS NFR BLD: 0 % (ref 0–2)
BILIRUB SERPL-MCNC: 0.3 MG/DL (ref 0.3–1.2)
BUN SERPL-MCNC: 9 MG/DL (ref 6–20)
CALCIUM SERPL-MCNC: 8.3 MG/DL (ref 8.6–10.4)
CHLORIDE SERPL-SCNC: 106 MMOL/L (ref 98–107)
CO2 SERPL-SCNC: 25 MMOL/L (ref 20–31)
CREAT SERPL-MCNC: 0.4 MG/DL (ref 0.7–1.2)
EKG ATRIAL RATE: 118 BPM
EKG ATRIAL RATE: 122 BPM
EKG P AXIS: 78 DEGREES
EKG P AXIS: 92 DEGREES
EKG P-R INTERVAL: 144 MS
EKG P-R INTERVAL: 160 MS
EKG Q-T INTERVAL: 334 MS
EKG Q-T INTERVAL: 338 MS
EKG QRS DURATION: 82 MS
EKG QRS DURATION: 88 MS
EKG QTC CALCULATION (BAZETT): 473 MS
EKG QTC CALCULATION (BAZETT): 475 MS
EKG R AXIS: 105 DEGREES
EKG R AXIS: 90 DEGREES
EKG T AXIS: 82 DEGREES
EKG T AXIS: 88 DEGREES
EKG VENTRICULAR RATE: 118 BPM
EKG VENTRICULAR RATE: 122 BPM
EOSINOPHIL # BLD: <0.03 K/UL (ref 0–0.44)
EOSINOPHILS RELATIVE PERCENT: 0 % (ref 1–4)
ERYTHROCYTE [DISTWIDTH] IN BLOOD BY AUTOMATED COUNT: 12.6 % (ref 11.8–14.4)
GFR SERPL CREATININE-BSD FRML MDRD: >60 ML/MIN/1.73M2
GLUCOSE SERPL-MCNC: 100 MG/DL (ref 70–99)
HCT VFR BLD AUTO: 43.9 % (ref 40.7–50.3)
HGB BLD-MCNC: 14.5 G/DL (ref 13–17)
IMM GRANULOCYTES # BLD AUTO: 0.05 K/UL (ref 0–0.3)
IMM GRANULOCYTES NFR BLD: 0 %
LYMPHOCYTES NFR BLD: 0.84 K/UL (ref 1.1–3.7)
LYMPHOCYTES RELATIVE PERCENT: 7 % (ref 24–43)
MCH RBC QN AUTO: 33.7 PG (ref 25.2–33.5)
MCHC RBC AUTO-ENTMCNC: 33 G/DL (ref 28.4–34.8)
MCV RBC AUTO: 102.1 FL (ref 82.6–102.9)
MONOCYTES NFR BLD: 0.59 K/UL (ref 0.1–1.2)
MONOCYTES NFR BLD: 5 % (ref 3–12)
NEUTROPHILS NFR BLD: 88 % (ref 36–65)
NEUTS SEG NFR BLD: 10.98 K/UL (ref 1.5–8.1)
NRBC BLD-RTO: 0 PER 100 WBC
PLATELET # BLD AUTO: 223 K/UL (ref 138–453)
PMV BLD AUTO: 9.3 FL (ref 8.1–13.5)
POTASSIUM SERPL-SCNC: 4 MMOL/L (ref 3.7–5.3)
PROT SERPL-MCNC: 5 G/DL (ref 6.4–8.3)
RBC # BLD AUTO: 4.3 M/UL (ref 4.21–5.77)
SODIUM SERPL-SCNC: 139 MMOL/L (ref 135–144)
WBC OTHER # BLD: 12.5 K/UL (ref 3.5–11.3)

## 2023-09-13 PROCEDURE — 94640 AIRWAY INHALATION TREATMENT: CPT

## 2023-09-13 PROCEDURE — 2580000003 HC RX 258

## 2023-09-13 PROCEDURE — 6370000000 HC RX 637 (ALT 250 FOR IP)

## 2023-09-13 PROCEDURE — 80053 COMPREHEN METABOLIC PANEL: CPT

## 2023-09-13 PROCEDURE — 6360000002 HC RX W HCPCS

## 2023-09-13 PROCEDURE — 97530 THERAPEUTIC ACTIVITIES: CPT

## 2023-09-13 PROCEDURE — 71045 X-RAY EXAM CHEST 1 VIEW: CPT

## 2023-09-13 PROCEDURE — 2500000003 HC RX 250 WO HCPCS

## 2023-09-13 PROCEDURE — 93010 ELECTROCARDIOGRAM REPORT: CPT | Performed by: INTERNAL MEDICINE

## 2023-09-13 PROCEDURE — 97535 SELF CARE MNGMENT TRAINING: CPT

## 2023-09-13 PROCEDURE — 36415 COLL VENOUS BLD VENIPUNCTURE: CPT

## 2023-09-13 PROCEDURE — 85025 COMPLETE CBC W/AUTO DIFF WBC: CPT

## 2023-09-13 PROCEDURE — 2700000000 HC OXYGEN THERAPY PER DAY

## 2023-09-13 PROCEDURE — 97162 PT EVAL MOD COMPLEX 30 MIN: CPT

## 2023-09-13 PROCEDURE — 2060000000 HC ICU INTERMEDIATE R&B

## 2023-09-13 PROCEDURE — 99291 CRITICAL CARE FIRST HOUR: CPT | Performed by: INTERNAL MEDICINE

## 2023-09-13 PROCEDURE — 99232 SBSQ HOSP IP/OBS MODERATE 35: CPT | Performed by: OTOLARYNGOLOGY

## 2023-09-13 PROCEDURE — 94761 N-INVAS EAR/PLS OXIMETRY MLT: CPT

## 2023-09-13 PROCEDURE — 97166 OT EVAL MOD COMPLEX 45 MIN: CPT

## 2023-09-13 RX ORDER — LORAZEPAM 2 MG/ML
1 INJECTION INTRAMUSCULAR
Status: DISCONTINUED | OUTPATIENT
Start: 2023-09-13 | End: 2023-09-17

## 2023-09-13 RX ORDER — LORAZEPAM 2 MG/1
2 TABLET ORAL
Status: DISCONTINUED | OUTPATIENT
Start: 2023-09-13 | End: 2023-09-17

## 2023-09-13 RX ORDER — FOLIC ACID 1 MG/1
1 TABLET ORAL DAILY
Status: DISCONTINUED | OUTPATIENT
Start: 2023-09-14 | End: 2023-09-22 | Stop reason: HOSPADM

## 2023-09-13 RX ORDER — LORAZEPAM 2 MG/ML
4 INJECTION INTRAMUSCULAR
Status: DISCONTINUED | OUTPATIENT
Start: 2023-09-13 | End: 2023-09-14

## 2023-09-13 RX ORDER — SODIUM CHLORIDE 0.9 % (FLUSH) 0.9 %
5-40 SYRINGE (ML) INJECTION PRN
Status: DISCONTINUED | OUTPATIENT
Start: 2023-09-13 | End: 2023-09-22 | Stop reason: HOSPADM

## 2023-09-13 RX ORDER — M-VIT,TX,IRON,MINS/CALC/FOLIC 27MG-0.4MG
1 TABLET ORAL DAILY
Status: DISCONTINUED | OUTPATIENT
Start: 2023-09-13 | End: 2023-09-22 | Stop reason: HOSPADM

## 2023-09-13 RX ORDER — LORAZEPAM 2 MG/ML
3 INJECTION INTRAMUSCULAR
Status: DISCONTINUED | OUTPATIENT
Start: 2023-09-13 | End: 2023-09-14

## 2023-09-13 RX ORDER — SODIUM CHLORIDE 9 MG/ML
INJECTION, SOLUTION INTRAVENOUS PRN
Status: DISCONTINUED | OUTPATIENT
Start: 2023-09-13 | End: 2023-09-22 | Stop reason: HOSPADM

## 2023-09-13 RX ORDER — NICOTINE 21 MG/24HR
1 PATCH, TRANSDERMAL 24 HOURS TRANSDERMAL DAILY
Status: DISCONTINUED | OUTPATIENT
Start: 2023-09-13 | End: 2023-09-22 | Stop reason: HOSPADM

## 2023-09-13 RX ORDER — SODIUM CHLORIDE 0.9 % (FLUSH) 0.9 %
5-40 SYRINGE (ML) INJECTION EVERY 12 HOURS SCHEDULED
Status: DISCONTINUED | OUTPATIENT
Start: 2023-09-13 | End: 2023-09-22 | Stop reason: HOSPADM

## 2023-09-13 RX ORDER — LORAZEPAM 1 MG/1
1 TABLET ORAL
Status: DISCONTINUED | OUTPATIENT
Start: 2023-09-13 | End: 2023-09-17

## 2023-09-13 RX ORDER — LORAZEPAM 2 MG/1
4 TABLET ORAL
Status: DISCONTINUED | OUTPATIENT
Start: 2023-09-13 | End: 2023-09-14

## 2023-09-13 RX ORDER — LANOLIN ALCOHOL/MO/W.PET/CERES
100 CREAM (GRAM) TOPICAL DAILY
Status: DISCONTINUED | OUTPATIENT
Start: 2023-09-14 | End: 2023-09-22 | Stop reason: HOSPADM

## 2023-09-13 RX ORDER — ENOXAPARIN SODIUM 100 MG/ML
40 INJECTION SUBCUTANEOUS DAILY
Status: DISCONTINUED | OUTPATIENT
Start: 2023-09-13 | End: 2023-09-18

## 2023-09-13 RX ORDER — LORAZEPAM 2 MG/ML
2 INJECTION INTRAMUSCULAR
Status: DISCONTINUED | OUTPATIENT
Start: 2023-09-13 | End: 2023-09-17

## 2023-09-13 RX ADMIN — AMPICILLIN SODIUM AND SULBACTAM SODIUM 3000 MG: 2; 1 INJECTION, POWDER, FOR SOLUTION INTRAMUSCULAR; INTRAVENOUS at 00:34

## 2023-09-13 RX ADMIN — Medication 1 TABLET: at 16:56

## 2023-09-13 RX ADMIN — AMPICILLIN SODIUM AND SULBACTAM SODIUM 3000 MG: 2; 1 INJECTION, POWDER, FOR SOLUTION INTRAMUSCULAR; INTRAVENOUS at 17:56

## 2023-09-13 RX ADMIN — AMPICILLIN SODIUM AND SULBACTAM SODIUM 3000 MG: 2; 1 INJECTION, POWDER, FOR SOLUTION INTRAMUSCULAR; INTRAVENOUS at 12:26

## 2023-09-13 RX ADMIN — VANCOMYCIN HYDROCHLORIDE 1250 MG: 1.25 INJECTION, POWDER, LYOPHILIZED, FOR SOLUTION INTRAVENOUS at 18:58

## 2023-09-13 RX ADMIN — AMPICILLIN SODIUM AND SULBACTAM SODIUM 3000 MG: 2; 1 INJECTION, POWDER, FOR SOLUTION INTRAMUSCULAR; INTRAVENOUS at 05:59

## 2023-09-13 RX ADMIN — IPRATROPIUM BROMIDE AND ALBUTEROL SULFATE 1 DOSE: 2.5; .5 SOLUTION RESPIRATORY (INHALATION) at 11:35

## 2023-09-13 RX ADMIN — THIAMINE HYDROCHLORIDE: 100 INJECTION, SOLUTION INTRAMUSCULAR; INTRAVENOUS at 08:19

## 2023-09-13 RX ADMIN — IPRATROPIUM BROMIDE AND ALBUTEROL SULFATE 1 DOSE: 2.5; .5 SOLUTION RESPIRATORY (INHALATION) at 08:22

## 2023-09-13 RX ADMIN — LORAZEPAM 1 MG: 1 TABLET ORAL at 22:05

## 2023-09-13 RX ADMIN — LORAZEPAM 1 MG: 1 TABLET ORAL at 17:05

## 2023-09-13 RX ADMIN — SODIUM CHLORIDE: 9 INJECTION, SOLUTION INTRAVENOUS at 16:58

## 2023-09-13 RX ADMIN — LORAZEPAM 1 MG: 1 TABLET ORAL at 11:47

## 2023-09-13 RX ADMIN — VANCOMYCIN HYDROCHLORIDE 1250 MG: 1.25 INJECTION, POWDER, LYOPHILIZED, FOR SOLUTION INTRAVENOUS at 06:41

## 2023-09-13 RX ADMIN — ENOXAPARIN SODIUM 40 MG: 100 INJECTION SUBCUTANEOUS at 12:23

## 2023-09-13 RX ADMIN — SODIUM CHLORIDE: 9 INJECTION, SOLUTION INTRAVENOUS at 04:32

## 2023-09-13 RX ADMIN — IPRATROPIUM BROMIDE AND ALBUTEROL SULFATE 1 DOSE: 2.5; .5 SOLUTION RESPIRATORY (INHALATION) at 15:51

## 2023-09-13 ASSESSMENT — PAIN SCALES - GENERAL
PAINLEVEL_OUTOF10: 4
PAINLEVEL_OUTOF10: 0
PAINLEVEL_OUTOF10: 4

## 2023-09-13 ASSESSMENT — PAIN DESCRIPTION - DESCRIPTORS: DESCRIPTORS: DISCOMFORT

## 2023-09-13 ASSESSMENT — PAIN DESCRIPTION - LOCATION: LOCATION: CHEST

## 2023-09-13 ASSESSMENT — PAIN - FUNCTIONAL ASSESSMENT: PAIN_FUNCTIONAL_ASSESSMENT: PREVENTS OR INTERFERES SOME ACTIVE ACTIVITIES AND ADLS

## 2023-09-13 ASSESSMENT — PAIN DESCRIPTION - PAIN TYPE: TYPE: ACUTE PAIN

## 2023-09-13 ASSESSMENT — PAIN DESCRIPTION - FREQUENCY: FREQUENCY: INTERMITTENT

## 2023-09-13 ASSESSMENT — PAIN DESCRIPTION - ORIENTATION: ORIENTATION: RIGHT;UPPER

## 2023-09-13 NOTE — PROGRESS NOTES
ENT/OTOLARYNGOLOGY SUBSEQUENT CARE PROGRESS NOTE     REASON FOR CARE: Neck abscesses from dental infection     HISTORY OF PRESENT ILLNESS:   Jd Sutton is a 51 y.o. who is being seen for follow-up rounds after incision and drainage of bilateral neck abscesses yesterday. Pertinent Examination:   MOUTH/THROAT: mucous membranes moist, no focal lesions, no tonsillar enlargement or exudate; poor dentition  NECK: drains in place, working     RELEVANT LABS/STUDIES:   Additional data reviewed:    CBC reviewed    Specimen Description . NECK . ABSCESS P    Direct Exam MANY NEUTROPHILS Abnormal  P     MIXED BACTERIAL MORPHOTYPES SEEN ON GRAM STAIN. Abnormal  P        Surgical risk factors:  COPD, HTN     IMPRESSION AND RECOMMENDATIONS:   Jd Sutton is a 51 y.o. male with odontogenic source of bilateral neck abscesses, L>>R. S/p incision and drainage yesterday    Plan:  Continue IV ABX  Await culture results  Change neck gauze dressing daily and as needed when saturated  Will be very conservative on drain removal as abscess cavities are large L>>R  May remove the drain on right side as early as Saturday AM, but timing of left removal will depend on clinical progress  Discussed importance of dental/oral surgery follow-up after discharge home    I discussed plan with patient as well as the primary team who was on rounds.       --------------------------------------------------  Tulio Johnson MD  Pediatric Otolaryngology-Head and Neck Surgery  [facility]    Office  ph# [phone]    Also available in [facility]

## 2023-09-13 NOTE — PROGRESS NOTES
51year old male with PMH - COPD and anxiety. Presented from [address] in with SOB - sudden onsent - found to have large right sided pneumothorax. Chest tube was placed in the ED. Patient has extensive smoking history. Patient was also noted to have large mass in neck area which was worsening over last 3 - 4 days. Bilateral submandibular abscess  -odontogenic in origin- ENT did I and D in OR. HDS  On 2 L oxygen  Chest tube - still has leak  Unasyn and vancomycin  ENT - want to leave drain in  More oral fluid - cut down fluids  CIWA - developing withdrawal   Smoker - Nicotine patch    Assessment and plan  Bilateral submandibular abscesses - drains in placed. R side drain may be able to come out on Saturday am. Left depending on clinical progress per ENT. Culture negative. Unasyn and vancomycin  R Apical pneumothorax - 1.3 cm. Chest tube to suction - 20  R middle lobe aspiration pna - unasyn and vancomycin. Respiratory cultures not obtained in ICU.   Polysubstance abuse and hx of alcohol use disorder - thiamine and folic acid  Concern for etoh withdrawal per ICU - CIWA in place      DVT prophylaxis - lovenox

## 2023-09-13 NOTE — PROGRESS NOTES
folic acid  1 mg Oral Daily    sodium chloride flush  5-40 mL IntraVENous 2 times per day    ipratropium 0.5 mg-albuterol 2.5 mg  1 Dose Inhalation Q4H WA RT    ampicillin-sulbactam  3,000 mg IntraVENous Q6H    sodium chloride flush  5-40 mL IntraVENous 2 times per day    vancomycin (VANCOCIN) intermittent dosing (placeholder)   Other RX Placeholder    vancomycin  1,250 mg IntraVENous Q12H     Continuous Infusions:   sodium chloride      sodium chloride Stopped (09/13/23 1304)    sodium chloride       PRN Meds:sodium chloride flush, sodium chloride, LORazepam **OR** LORazepam **OR** LORazepam **OR** LORazepam **OR** LORazepam **OR** LORazepam **OR** LORazepam **OR** LORazepam, sodium chloride flush, sodium chloride, ondansetron **OR** ondansetron, polyethylene glycol, acetaminophen **OR** acetaminophen, potassium chloride **OR** potassium chloride, magnesium sulfate, sodium chloride flush, sodium chloride, HYDROmorphone, HYDROmorphone, hydrALAZINE, morphine    Objective:    CBC:   Recent Labs     09/12/23  0355 09/13/23  0506   WBC 15.4* 12.5*   HGB 17.3* 14.5    223     BMP:    Recent Labs     09/12/23  0355 09/12/23  1237 09/13/23  0506   * 135 139   K 3.5* 4.2 4.0   CL 94* 99 106   CO2 27 29 25   BUN 7 7 9   CREATININE 0.6* 0.5* 0.4*   GLUCOSE 135* 155* 100*     Calcium:  Recent Labs     09/13/23  0506   CALCIUM 8.3*     Ionized Calcium:No results for input(s): \"IONCA\" in the last 72 hours. Magnesium:No results for input(s): \"MG\" in the last 72 hours. Phosphorus:No results for input(s): \"PHOS\" in the last 72 hours. BNP:No results for input(s): \"BNP\" in the last 72 hours. Glucose:No results for input(s): \"POCGLU\" in the last 72 hours. HgbA1C: No results for input(s): \"LABA1C\" in the last 72 hours.   INR:   Recent Labs     09/12/23  0355   INR 1.0     Hepatic:   Recent Labs     09/12/23  0355 09/13/23  0506   ALKPHOS 123 75   ALT 11 7   AST 20 13   PROT 7.5 5.0*   BILITOT 0.9 0.3   BILIDIR 0.3*  --

## 2023-09-13 NOTE — PLAN OF CARE
Problem: Respiratory - Adult  Goal: Achieves optimal ventilation and oxygenation  Outcome: Progressing    BRONCHOSPASM/BRONCHOCONSTRICTION     [x]         IMPROVE AERATION/BREATH SOUNDS  [x]   ADMINISTER BRONCHODILATOR THERAPY AS APPROPRIATE  [x]   ASSESS BREATH SOUNDS  [x]   IMPLEMENT AEROSOL/MDI PROTOCOL  [x]   PATIENT EDUCATION AS NEEDED

## 2023-09-13 NOTE — PLAN OF CARE
Problem: Discharge Planning  Goal: Discharge to home or other facility with appropriate resources  9/13/2023 0315 by Concha Hermosillo RN  Outcome: Progressing     Problem: Pain  Goal: Verbalizes/displays adequate comfort level or baseline comfort level  9/13/2023 0315 by Concha Hermosillo RN  Outcome: Progressing     Problem: Skin/Tissue Integrity  Goal: Absence of new skin breakdown  Description: 1. Monitor for areas of redness and/or skin breakdown  2. Assess vascular access sites hourly  3. Every 4-6 hours minimum:  Change oxygen saturation probe site  4. Every 4-6 hours:  If on nasal continuous positive airway pressure, respiratory therapy assess nares and determine need for appliance change or resting period.   9/13/2023 0315 by Concha Hermosillo RN  Outcome: Progressing     Problem: Safety - Adult  Goal: Free from fall injury  9/13/2023 0315 by Concha Hermosillo RN  Outcome: Progressing  [address] (Taken 9/12/2023 2149)  Free From Fall Injury: Instruct family/caregiver on patient safety     Problem: Safety - Adult  Goal: Free from fall injury  9/13/2023 0315 by Concha Hermosillo RN  Outcome: Progressing  [address] (Taken 9/12/2023 2149)  Free From Fall Injury: Instruct family/caregiver on patient safety     Problem: ABCDS Injury Assessment  Goal: Absence of physical injury  9/13/2023 0315 by Concha Hermosillo RN  Outcome: Progressing  Flowsheets (Taken 9/12/2023 2149)  Absence of Physical Injury: Implement safety measures based on patient assessment

## 2023-09-13 NOTE — PROGRESS NOTES
Critical care team - Resident sign-out to medicine service      Date and time: 9/13/2023 5:11 PM  Patient's name:  Jd Sutton  Medical Record Number: [id number]  Patient's account/billing number: [de-identified]  Patient's YOB: 1975  Age: 51 y.o. Date of Admission: 9/12/2023 10:56 AM  Length of stay during current admission: 1    Primary Care Physician: No primary care provider on file. Code Status: Full Code    Mode of physician to physician communication:        [x] Via telephone   [] In person     Date and time of sign-out: 9/13/2023 5:11 PM    Accepting Internal Medicine resident: Dr. Raymond Watts    Accepting Medicine team: IM Team Med 1    Accepting team's attending: Dr. Orville Silverman    Patient's current ICU Bed:  9176     Patient's assigned bed on floor:  422        [] Med-Surg Monitored [x] Step-down       [] Psychiatry ICU       [] Psych floor     Reason for ICU admission:     Bilateral submandibular abscess, pneumothorax    ICU course summary:     Jd Sutton is a 51 y.o. with a PMH of COPD and anxiety. Patient presents with an abrupt onset of shortness of breath. He does have a significant history of 30 years of smoking 1/2 packs for 15 of those years. He has smoked 2 backs for the later 15 years. Patient denies recent trauma. He does have COPD for which he does take inhalers, but couldn't remember the names. States that he has had left sided swelling, tenderness and erythema under the left jaw the past two days. He was concerned about this swelling because his father had head and neck cancer diagnosed in the past. He was worried about this presentation because it seemed similar to his father. Says he does not brush his teeth often     Patient also has a history of anxiety for which he takes trazodone. Has a chronic history of alcohol abuse. States he drinks a max of 4 beers nightly.       ED course  Patient initially presented to [address] [place] [address]. He was found to have extreme

## 2023-09-13 NOTE — PROGRESS NOTES
Physical Therapy  Facility/Department: [facility]- MICU  Physical Therapy Initial Assessment    Name: Frantz Caballero  : 1975  MRN: [id number]  Date of Service: 2023  Chief Complaint   Patient presents with    Shortness of Breath       Discharge Recommendations:    Further therapy recommended at discharge. PT Equipment Recommendations  Equipment Needed: No      Patient Diagnosis(es): The primary encounter diagnosis was Primary spontaneous pneumothorax. Diagnoses of Submandibular abscess and Neck abscess were also pertinent to this visit. Past Medical History:  has a past medical history of COPD (chronic obstructive pulmonary disease) ([address]) and Hypertension. Past Surgical History:  has a past surgical history that includes Incision and Drainage of Neck Abscess (2023) and Neck surgery (Bilateral, 2023). Assessment   Body Structures, Functions, Activity Limitations Requiring Skilled Therapeutic Intervention: Decreased functional mobility ; Decreased strength;Decreased endurance;Decreased balance  Assessment: Pt amb 450' CGA no AD. Pt reports difficulty w/ negotiating stairs at home, denies falls. Pt would benefit from continued acute PT to address deficits. Therapy Prognosis: Good  Decision Making: Medium Complexity  Requires PT Follow-Up: Yes  Activity Tolerance  Activity Tolerance: Patient tolerated treatment well     Plan   Physcial Therapy Plan  General Plan:  (3-5x/wk)  Current Treatment Recommendations: Strengthening, Balance training, Functional mobility training, Transfer training, Endurance training, Gait training, Stair training, Neuromuscular re-education, Home exercise program, Safety education & training, Patient/Caregiver education & training, Equipment evaluation, education, & procurement, Therapeutic activities  Safety Devices  Type of Devices:  All fall risk precautions in place, Patient at risk for falls, Call light within reach, Left in bed, Gait belt, Nurse

## 2023-09-13 NOTE — PROGRESS NOTES
Occupational Therapy  Facility/Department: [facility]  Occupational Therapy Initial Assessment    Name: Angelina Denver  : 1975  MRN: [id number]  Date of Service: 2023  Chief Complaint   Patient presents with    Shortness of Breath      Discharge Recommendations:  Patient would benefit from continued therapy after discharge  OT Equipment Recommendations  Equipment Needed: Yes  Mobility Devices: ADL Assistive Devices  ADL Assistive Devices: Hand-held Shower; Reacher;Grab Bars - toilet;Long-handled Sponge;Grab Bars - shower;Sock-Aid Hard       Patient Diagnosis(es): The primary encounter diagnosis was Primary spontaneous pneumothorax. Diagnoses of Submandibular abscess and Neck abscess were also pertinent to this visit. Past Medical History:  has a past medical history of COPD (chronic obstructive pulmonary disease) ([address]) and Hypertension. Past Surgical History:  has a past surgical history that includes Incision and Drainage of Neck Abscess (2023) and Neck surgery (Bilateral, 2023). Assessment   Performance deficits / Impairments: Decreased functional mobility ; Decreased balance;Decreased safe awareness;Decreased ADL status; Decreased endurance;Decreased high-level IADLs;Decreased coordination  Assessment: Pt demo'd decreased functional mobility this date d/t requiring CGA and use of RW for stability and balance. Pt SBA for bed mobility. Pt CGA for functional transfers. Pt able to IND wash face but required CGA for dynamic standing d/t decreased balance and need for safety, demo'ing decreased ADL status. Pt demo'd decreased safety awareness overall, limiting participation in functional activities. Pt would benefit from continued acute services to address functional limitations through skilled intervention.   Prognosis: Good  Decision Making: Medium Complexity  REQUIRES OT FOLLOW-UP: Yes  Activity Tolerance  Activity Tolerance: Patient Tolerated treatment well;Patient limited by Hobbies: Drawing  Additional Comments: Brother able to assist PRN but works. No one else around to help PRN       Objective   Safety Devices  Type of Devices: Nurse notified; All fall risk precautions in place;Call light within reach;Gait belt;Left in bed  Restraints  Restraints Initially in Place: No  Balance  Sitting: Without support (Pt sat unsupported with CGA x20 min, demo'ing 0 LOB.)  Standing: With support (Pt stood with CGA and use of RW x2 min to prepare for functional mobility task, demo'ing 0 LOB.)  Transfer Training  Transfer Training: Yes  Overall Level of Assistance: Adaptive equipment;Contact-guard assistance (Pt CGA for functional transfers this date with use of RW.)  Interventions: Safety awareness training;Verbal cues (Verbal and safety cues required for proper hand placement to push off of bed as well as breathing techniques during movement.)  Sit to Stand: Contact-guard assistance; Adaptive equipment  Stand to Sit: Contact-guard assistance; Adaptive equipment  Gait  Overall Level of Assistance: Contact-guard assistance; Adaptive equipment (Pt completed functional mobility with CGA and use of RW from EOB > bathroom sink > EOB to simulate household distances.)  Interventions: Safety awareness training;Verbal cues (Verbal and safety cues provided PRN for breathing during functional mobility)  Assistive Device: Walker, rolling;Gait belt     AROM: Generally decreased, functional (AROM B shoulder flexion ~90 degrees, pain limited.)  Strength: Generally decreased, functional (Grossly 4-/5; R shoulder flexion unattempted d/t chest tube)  Coordination: Generally decreased, functional  Tone: Normal  Sensation: Intact  ADL  Feeding: Independent  Grooming: Independent  Grooming Skilled Clinical Factors: Pt able to IND wash his face while standing at sink but required SBA for dynamic standing d/t need for safety. UE Bathing: Stand by assistance; Increased time to complete  LE Bathing: Contact guard

## 2023-09-13 NOTE — CARE COORDINATION
p Case Management Assessment  Initial Evaluation    Date/Time of Evaluation: 9/13/2023 3:46 PM  Assessment Completed by: Sam Arredondo RN    If patient is discharged prior to next notation, then this note serves as note for discharge by case management. Patient Name: Tanesha Machuca                   YOB: 1975  Diagnosis: Neck abscess [L02.11]  Submandibular abscess [K12.2]  Primary spontaneous pneumothorax [J93.11]                   Date / Time: 9/12/2023 10:56 AM    Patient Admission Status: Inpatient   Readmission Risk (Low < 19, Mod (19-27), High > 27): Readmission Risk Score: 11.9    Current PCP: No primary care provider on file. PCP verified by CM? (P) No (had [facility] in [place], PCP list provided to pt's brother)    Chart Reviewed: Yes      History Provided by: (P) Child/Family (pt's brother, pt is now living w/ brother)  Patient Orientation: (P) Unable to Assess    Patient Cognition: (P) Other (see comment) (A & O, CIWA scale)    Hospitalization in the last 30 days (Readmission):  No    If yes, Readmission Assessment in CM Navigator will be completed.     Advance Directives:      Code Status: Full Code   Patient's Primary Decision Maker is:        Discharge Planning:    Patient lives with: (P) Family Members Type of Home: (P) House  Primary Care Giver: (P) Self  Patient Support Systems include: (P) Family Members   Current Financial resources: (P) Medicaid, HELP (pt has [place], now living in [place], provided pt's brother with [employer] (HELP) number to call for help/information on changing Medicaid.)  Current community resources:    Current services prior to admission: (P) Durable Medical Equipment            Current DME: (P) Home Aerosol            Type of Home Care services:       ADLS  Prior functional level: (P) Independent in ADLs/IADLs  Current functional level: (P) Independent in ADLs/IADLs    PT AM-PAC: 20 /24  OT AM-PAC:   /24    Family can provide assistance at DC: (P)

## 2023-09-13 NOTE — PROGRESS NOTES
Critical Care Team - Daily Progress Note      Date and time: 9/13/2023 6:48 AM  Patient's name:  Marsha Moreno  Medical Record Number: [id number]  Patient's account/billing number: [de-identified]  Patient's YOB: 1975  Age: 51 y.o. Date of Admission: 9/12/2023 10:56 AM  Length of stay during current admission: 1      Primary Care Physician: No primary care provider on file. ICU Attending Physician: Dr. Johnna Collet Status: Full Code    Reason for ICU admission:   Chief Complaint   Patient presents with    Shortness of Breath     Marsha Moreno is a 51 y.o. with a PMH of COPD and anxiety. Patient presents with an abrupt onset of shortness of breath. He does have a significant history of 30 years of smoking 1/2 packs for 15 of those years. He has smoked 2 backs for the later 15 years. Patient denies recent trauma. He does have COPD for which he does take inhalers, but couldn't remember the names. States that he has had left sided swelling, tenderness and erythema under the left jaw the past two days. He was concerned about this swelling because his father had head and neck cancer diagnosed in the past. He was worried about this presentation because it seemed similar to his father. Says he does not brush his teeth often     Patient also has a history of anxiety for which he takes trazodone. Has a chronic history of alcohol abuse. States he drinks a max of 4 beers nightly. ED course  Patient initially presented to [address] [place] [address]. He was found to have extreme shortness of breath and decreased breath sounds on the right. A chest xray revealed a right sided pneumothorax. Chest tube was placed and the patient was given a non-breather mask. Also, had a CT head completed for left sided neck swelling. B/l submandibular abscesses noted on CT head. Patient was transferred to [facility] for ENT evaluation. [facility] ED has not noted any stridor or concern for airway compromise.  Patient was given Prophylaxis:  DVT: Lovenox        Umm Del Rosario MD, M.D. Department of Internal Medicine/ Critical care  [facility] ([place])             9/13/2023, 6:48 AM       Attending Physician Statement  I have discussed the care of Akash Torres, including pertinent history and exam findings with the resident. I have reviewed the key elements of all parts of the encounter with the resident. I have seen and examined the patient with the resident. I agree with the assessment and plan and status of the problem list as documented. I have seen the patient during the rounds this morning, chart reviewed overnight events noted and imaging studies seen. Overnight patient remained on oxygen remain hemodynamically stable no further fever spike was reported. He does have bilateral drain present in submandibular area status post I&D of submandibular abscess. Cultures so far growing mixed bacterial and many neutrophils. He does not have any dysphagia. Chest tube site was inspected and in place. Chest x-ray shows right lower lobe consolidation likely aspiration no significant pneumothorax was seen on the chest x-ray likely have some right basilar atelectasis small pneumothorax cannot be excluded. Chest tube does have a leak present minimal drainage and chest tube. Continue chest tube to suction. Daily chest x-ray for now. Continue with Unasyn and vancomycin. Follow-up culture and adjust antibiotic. Continue with DuoNeb aerosol. Would avoid steroid. Continue to follow leak from the chest tube. We will transfer patient to stepdown unit with medicine team and will follow as pulmonary        Discussed with nursing staff, treatment and plan discussed.   Discussed with respiratory therapist.    Total critical care time caring for this patient with life threatening, unstable organ failure, including direct patient contact, management of life support systems, review of

## 2023-09-14 ENCOUNTER — APPOINTMENT (OUTPATIENT)
Dept: GENERAL RADIOLOGY | Age: 48
DRG: 364 | End: 2023-09-14
Payer: MEDICAID

## 2023-09-14 ENCOUNTER — APPOINTMENT (OUTPATIENT)
Dept: INTERVENTIONAL RADIOLOGY/VASCULAR | Age: 48
DRG: 364 | End: 2023-09-14
Payer: MEDICAID

## 2023-09-14 LAB
ALBUMIN SERPL-MCNC: 2.1 G/DL (ref 3.5–5.2)
ALBUMIN/GLOB SERPL: 0.8 {RATIO} (ref 1–2.5)
ALP SERPL-CCNC: 65 U/L (ref 40–129)
ALT SERPL-CCNC: 7 U/L (ref 5–41)
AMPHET UR QL SCN: NEGATIVE
ANION GAP SERPL CALCULATED.3IONS-SCNC: 5 MMOL/L (ref 9–17)
AST SERPL-CCNC: 15 U/L
BARBITURATES UR QL SCN: NEGATIVE
BASOPHILS # BLD: 0 K/UL (ref 0–0.2)
BASOPHILS NFR BLD: 0 % (ref 0–2)
BENZODIAZ UR QL: NEGATIVE
BILIRUB SERPL-MCNC: 0.4 MG/DL (ref 0.3–1.2)
BUN SERPL-MCNC: 8 MG/DL (ref 6–20)
CALCIUM SERPL-MCNC: 7.9 MG/DL (ref 8.6–10.4)
CANNABINOIDS UR QL SCN: POSITIVE
CHLORIDE SERPL-SCNC: 104 MMOL/L (ref 98–107)
CO2 SERPL-SCNC: 27 MMOL/L (ref 20–31)
COCAINE UR QL SCN: NEGATIVE
CREAT SERPL-MCNC: 0.4 MG/DL (ref 0.7–1.2)
EOSINOPHIL # BLD: 0.07 K/UL (ref 0–0.44)
EOSINOPHILS RELATIVE PERCENT: 1 % (ref 1–4)
ERYTHROCYTE [DISTWIDTH] IN BLOOD BY AUTOMATED COUNT: 12.8 % (ref 11.8–14.4)
FENTANYL UR QL: POSITIVE
GFR SERPL CREATININE-BSD FRML MDRD: >60 ML/MIN/1.73M2
GLUCOSE SERPL-MCNC: 90 MG/DL (ref 70–99)
HCT VFR BLD AUTO: 39.2 % (ref 40.7–50.3)
HGB BLD-MCNC: 12.7 G/DL (ref 13–17)
IMM GRANULOCYTES # BLD AUTO: 0 K/UL (ref 0–0.3)
IMM GRANULOCYTES NFR BLD: 0 %
LYMPHOCYTES NFR BLD: 1.25 K/UL (ref 1.1–3.7)
LYMPHOCYTES RELATIVE PERCENT: 19 % (ref 24–43)
MAGNESIUM SERPL-MCNC: 2 MG/DL (ref 1.6–2.6)
MCH RBC QN AUTO: 33.3 PG (ref 25.2–33.5)
MCHC RBC AUTO-ENTMCNC: 32.4 G/DL (ref 28.4–34.8)
MCV RBC AUTO: 102.9 FL (ref 82.6–102.9)
METHADONE UR QL: NEGATIVE
MONOCYTES NFR BLD: 0.4 K/UL (ref 0.1–1.2)
MONOCYTES NFR BLD: 6 % (ref 3–12)
MORPHOLOGY: NORMAL
MRSA, DNA, NASAL: NEGATIVE
NEUTROPHILS NFR BLD: 74 % (ref 36–65)
NEUTS SEG NFR BLD: 4.88 K/UL (ref 1.5–8.1)
NRBC BLD-RTO: 0 PER 100 WBC
OPIATES UR QL SCN: NEGATIVE
OXYCODONE UR QL SCN: NEGATIVE
PCP UR QL SCN: NEGATIVE
PLATELET # BLD AUTO: 194 K/UL (ref 138–453)
PMV BLD AUTO: 9 FL (ref 8.1–13.5)
POTASSIUM SERPL-SCNC: 3.2 MMOL/L (ref 3.7–5.3)
PROT SERPL-MCNC: 4.7 G/DL (ref 6.4–8.3)
RBC # BLD AUTO: 3.81 M/UL (ref 4.21–5.77)
SODIUM SERPL-SCNC: 136 MMOL/L (ref 135–144)
SPECIMEN DESCRIPTION: NORMAL
TEST INFORMATION: ABNORMAL
VANCOMYCIN SERPL-MCNC: 14.7 UG/ML
WBC OTHER # BLD: 6.6 K/UL (ref 3.5–11.3)

## 2023-09-14 PROCEDURE — 6360000002 HC RX W HCPCS

## 2023-09-14 PROCEDURE — 2580000003 HC RX 258

## 2023-09-14 PROCEDURE — 94761 N-INVAS EAR/PLS OXIMETRY MLT: CPT

## 2023-09-14 PROCEDURE — 6370000000 HC RX 637 (ALT 250 FOR IP)

## 2023-09-14 PROCEDURE — 80202 ASSAY OF VANCOMYCIN: CPT

## 2023-09-14 PROCEDURE — 2709999900 IR CHEST TUBE INSERTION

## 2023-09-14 PROCEDURE — 83735 ASSAY OF MAGNESIUM: CPT

## 2023-09-14 PROCEDURE — 2580000003 HC RX 258: Performed by: STUDENT IN AN ORGANIZED HEALTH CARE EDUCATION/TRAINING PROGRAM

## 2023-09-14 PROCEDURE — 0W9930Z DRAINAGE OF RIGHT PLEURAL CAVITY WITH DRAINAGE DEVICE, PERCUTANEOUS APPROACH: ICD-10-PCS | Performed by: RADIOLOGY

## 2023-09-14 PROCEDURE — 2700000000 HC OXYGEN THERAPY PER DAY

## 2023-09-14 PROCEDURE — 80307 DRUG TEST PRSMV CHEM ANLYZR: CPT

## 2023-09-14 PROCEDURE — 85025 COMPLETE CBC W/AUTO DIFF WBC: CPT

## 2023-09-14 PROCEDURE — 32557 INSERT CATH PLEURA W/ IMAGE: CPT

## 2023-09-14 PROCEDURE — 94640 AIRWAY INHALATION TREATMENT: CPT

## 2023-09-14 PROCEDURE — 99254 IP/OBS CNSLTJ NEW/EST MOD 60: CPT | Performed by: INTERNAL MEDICINE

## 2023-09-14 PROCEDURE — 71045 X-RAY EXAM CHEST 1 VIEW: CPT

## 2023-09-14 PROCEDURE — 36415 COLL VENOUS BLD VENIPUNCTURE: CPT

## 2023-09-14 PROCEDURE — 80053 COMPREHEN METABOLIC PANEL: CPT

## 2023-09-14 PROCEDURE — 2060000000 HC ICU INTERMEDIATE R&B

## 2023-09-14 PROCEDURE — 99232 SBSQ HOSP IP/OBS MODERATE 35: CPT | Performed by: OTOLARYNGOLOGY

## 2023-09-14 RX ORDER — TRAZODONE HYDROCHLORIDE 100 MG/1
100 TABLET ORAL NIGHTLY PRN
Status: DISCONTINUED | OUTPATIENT
Start: 2023-09-14 | End: 2023-09-22 | Stop reason: HOSPADM

## 2023-09-14 RX ORDER — POTASSIUM CHLORIDE 7.45 MG/ML
10 INJECTION INTRAVENOUS
Status: COMPLETED | OUTPATIENT
Start: 2023-09-14 | End: 2023-09-14

## 2023-09-14 RX ADMIN — POTASSIUM CHLORIDE 10 MEQ: 10 INJECTION, SOLUTION INTRAVENOUS at 11:04

## 2023-09-14 RX ADMIN — POTASSIUM CHLORIDE 10 MEQ: 10 INJECTION, SOLUTION INTRAVENOUS at 09:12

## 2023-09-14 RX ADMIN — POTASSIUM BICARBONATE 40 MEQ: 782 TABLET, EFFERVESCENT ORAL at 06:36

## 2023-09-14 RX ADMIN — POTASSIUM CHLORIDE 10 MEQ: 10 INJECTION, SOLUTION INTRAVENOUS at 08:01

## 2023-09-14 RX ADMIN — Medication 100 MG: at 08:01

## 2023-09-14 RX ADMIN — TRAZODONE HYDROCHLORIDE 100 MG: 100 TABLET ORAL at 21:12

## 2023-09-14 RX ADMIN — AMPICILLIN SODIUM AND SULBACTAM SODIUM 3000 MG: 2; 1 INJECTION, POWDER, FOR SOLUTION INTRAMUSCULAR; INTRAVENOUS at 12:18

## 2023-09-14 RX ADMIN — AMPICILLIN SODIUM AND SULBACTAM SODIUM 3000 MG: 2; 1 INJECTION, POWDER, FOR SOLUTION INTRAMUSCULAR; INTRAVENOUS at 05:33

## 2023-09-14 RX ADMIN — IPRATROPIUM BROMIDE AND ALBUTEROL SULFATE 1 DOSE: 2.5; .5 SOLUTION RESPIRATORY (INHALATION) at 08:06

## 2023-09-14 RX ADMIN — SODIUM CHLORIDE, PRESERVATIVE FREE 10 ML: 5 INJECTION INTRAVENOUS at 08:03

## 2023-09-14 RX ADMIN — ENOXAPARIN SODIUM 40 MG: 100 INJECTION SUBCUTANEOUS at 08:01

## 2023-09-14 RX ADMIN — IPRATROPIUM BROMIDE AND ALBUTEROL SULFATE 1 DOSE: 2.5; .5 SOLUTION RESPIRATORY (INHALATION) at 20:05

## 2023-09-14 RX ADMIN — LORAZEPAM 1 MG: 1 TABLET ORAL at 04:57

## 2023-09-14 RX ADMIN — IPRATROPIUM BROMIDE AND ALBUTEROL SULFATE 1 DOSE: 2.5; .5 SOLUTION RESPIRATORY (INHALATION) at 16:04

## 2023-09-14 RX ADMIN — AMPICILLIN SODIUM AND SULBACTAM SODIUM 3000 MG: 2; 1 INJECTION, POWDER, FOR SOLUTION INTRAMUSCULAR; INTRAVENOUS at 17:23

## 2023-09-14 RX ADMIN — Medication 1 TABLET: at 08:01

## 2023-09-14 RX ADMIN — AMPICILLIN SODIUM AND SULBACTAM SODIUM 3000 MG: 2; 1 INJECTION, POWDER, FOR SOLUTION INTRAMUSCULAR; INTRAVENOUS at 00:18

## 2023-09-14 RX ADMIN — VANCOMYCIN HYDROCHLORIDE 1250 MG: 1.25 INJECTION, POWDER, LYOPHILIZED, FOR SOLUTION INTRAVENOUS at 06:27

## 2023-09-14 RX ADMIN — POTASSIUM CHLORIDE 10 MEQ: 10 INJECTION, SOLUTION INTRAVENOUS at 10:04

## 2023-09-14 RX ADMIN — VANCOMYCIN HYDROCHLORIDE 1250 MG: 1.25 INJECTION, POWDER, LYOPHILIZED, FOR SOLUTION INTRAVENOUS at 19:31

## 2023-09-14 RX ADMIN — FOLIC ACID 1 MG: 1 TABLET ORAL at 08:01

## 2023-09-14 RX ADMIN — SODIUM CHLORIDE, PRESERVATIVE FREE 10 ML: 5 INJECTION INTRAVENOUS at 08:02

## 2023-09-14 NOTE — PLAN OF CARE
Problem: Discharge Planning  Goal: Discharge to home or other facility with appropriate resources  9/14/2023 0927 by Smooth Tirado RN  Outcome: Progressing  9/14/2023 0028 by Valentino Dumont RN  Outcome: Progressing     Problem: Pain  Goal: Verbalizes/displays adequate comfort level or baseline comfort level  9/14/2023 0927 by Smooth Tirado RN  Outcome: Progressing  9/14/2023 0028 by Valentino Dumont RN  Outcome: Progressing     Problem: Skin/Tissue Integrity  Goal: Absence of new skin breakdown  Description: 1. Monitor for areas of redness and/or skin breakdown  2. Assess vascular access sites hourly  3. Every 4-6 hours minimum:  Change oxygen saturation probe site  4. Every 4-6 hours:  If on nasal continuous positive airway pressure, respiratory therapy assess nares and determine need for appliance change or resting period.   9/14/2023 0927 by Smooth Tirado RN  Outcome: Progressing  9/14/2023 0028 by Valentino Dumont RN  Outcome: Progressing     Problem: Safety - Adult  Goal: Free from fall injury  Outcome: Progressing     Problem: ABCDS Injury Assessment  Goal: Absence of physical injury  Outcome: Progressing     Problem: Respiratory - Adult  Goal: Achieves optimal ventilation and oxygenation  Outcome: Progressing

## 2023-09-14 NOTE — PROGRESS NOTES
Report called to 4B RN, Yazmin Smiley. All questions answered. Patient transferred on 2 L NC and on cardiac monitor. All belongings with pt.    Electronically signed by Heraclio Martin RN on 9/13/2023 at 9:40 PM

## 2023-09-14 NOTE — PROGRESS NOTES
ENT/OTOLARYNGOLOGY SUBSEQUENT CARE PROGRESS NOTE     REASON FOR CARE: Neck Abscess/Dental infection     HISTORY OF PRESENT ILLNESS:   Joe Christianson is a 51 y.o. who is being seen for follow-up of neck infection from dental source. S/p bilateral incision and drainage of multiple spaces. Pertinent Examination:   MOUTH/THROAT: mucous membranes moist, no focal lesions, no tonsillar enlargement or exudate; poor dentition  NECK: drains in place, working, soft flaps, drainage seems to be reducing     RELEVANT LABS/STUDIES:   Additional data reviewed:        Direct Exam MANY NEUTROPHILS Abnormal  P     MIXED BACTERIAL MORPHOTYPES SEEN ON GRAM STAIN. Abnormal  P        Procedures:    None  Surgical risk factors:  COPD, HTN     IMPRESSION AND RECOMMENDATIONS:   Joe Christianson is a 51 y.o. male with odontogenic source of bilateral neck abscesses, L>>R.  S/p incision and drainage 9/12     Plan:  Continue IV ABX  Await final culture results  Change neck gauze dressing daily and as needed when saturated  Will be very conservative on drain removal as abscess cavities are large L>>R  May remove the drain on right side as early as Saturday AM, but timing of left removal will depend on clinical progress  Discussed importance of dental/oral surgery follow-up after discharge home          Sallie Delgadillo MD  Pediatric Otolaryngology-Head and Neck Surgery  [address] [facility] Ot[facility]    Office  ph# [phone]    Also available in [address] [place] [address]

## 2023-09-14 NOTE — PROGRESS NOTES
[facility]  Internal Medicine Teaching Residency Program  Inpatient Daily Progress Note  ______________________________________________________________________________    Patient: Pedro Pablo Avila  YOB: 1975   CPE:[id number]    Acct: [de-identified]     Room: /12-0  Admit date: 9/12/2023  Today's date: 09/14/23  Number of days in the hospital: 2    SUBJECTIVE   Admitting Diagnosis: Submandibular abscess  CC: confusion  Pt examined at bedside. Chart & results reviewed. No acute events overnight. Tachycardic 103-111. On 3.5L NC. Denies headache, chest pain, palpitations, SOB, and weakness. CXR shows worsening right-sided PTX and increasing subcutaneous emphysema on the right compared to 4/13. Pulmonology and CTS consulted. ROS:  Constitutional:  negative for chills, fevers, sweats  Respiratory:  negative for cough, dyspnea on exertion, hemoptysis, shortness of breath, wheezing  Cardiovascular:  negative for chest pain, chest pressure/discomfort, lower extremity edema, palpitations  Gastrointestinal:  negative for abdominal pain, constipation, diarrhea, nausea, vomiting  Neurological:  negative for dizziness, headache  BRIEF HISTORY     The patient is a 51 y.o. male with past medical history significant for smoking, COPD, and alcohol use who was initially admitted on 9/12/2023 with Neck abscess [L02.11]  Submandibular abscess [K12.2]  Primary spontaneous pneumothorax [J93.11] and presented to [facility] on 9/12 with abrupt onset SOB. Tachycardic, -130. CT chest showed a 3.46 cm right middle lobe PTX with mediastinal shift. Right-sided chest tube placed. Patient placed on a nonbreather mask. Patient also reported a large mass on her chin that had been expanding in size for the past 3-4 days, which she worried may have been cancer due to her father having a similar presentation at the time of his neck cancer diagnosis.  CT showed large BL

## 2023-09-14 NOTE — PLAN OF CARE
Problem: Respiratory - Adult  Goal: Achieves optimal ventilation and oxygenation  9/14/2023 1957 by David Cantu RCP  Outcome: Progressing

## 2023-09-15 ENCOUNTER — APPOINTMENT (OUTPATIENT)
Dept: GENERAL RADIOLOGY | Age: 48
DRG: 364 | End: 2023-09-15
Payer: MEDICAID

## 2023-09-15 LAB
ANION GAP SERPL CALCULATED.3IONS-SCNC: 8 MMOL/L (ref 9–16)
BASOPHILS # BLD: 0.03 K/UL (ref 0–0.2)
BASOPHILS NFR BLD: 1 % (ref 0–2)
BUN SERPL-MCNC: 4 MG/DL (ref 6–20)
CALCIUM SERPL-MCNC: 8.5 MG/DL (ref 8.6–10.4)
CHLORIDE SERPL-SCNC: 101 MMOL/L (ref 98–107)
CO2 SERPL-SCNC: 27 MMOL/L (ref 20–31)
CREAT SERPL-MCNC: 0.4 MG/DL (ref 0.7–1.2)
EOSINOPHIL # BLD: 0.1 K/UL (ref 0–0.44)
EOSINOPHILS RELATIVE PERCENT: 2 % (ref 1–4)
ERYTHROCYTE [DISTWIDTH] IN BLOOD BY AUTOMATED COUNT: 12.6 % (ref 11.8–14.4)
GFR SERPL CREATININE-BSD FRML MDRD: >60 ML/MIN/1.73M2
GLUCOSE SERPL-MCNC: 99 MG/DL (ref 70–99)
HCT VFR BLD AUTO: 42.6 % (ref 40.7–50.3)
HGB BLD-MCNC: 13.8 G/DL (ref 13–17)
IMM GRANULOCYTES # BLD AUTO: 0.03 K/UL (ref 0–0.3)
IMM GRANULOCYTES NFR BLD: 1 %
LYMPHOCYTES NFR BLD: 1.19 K/UL (ref 1.1–3.7)
LYMPHOCYTES RELATIVE PERCENT: 19 % (ref 24–43)
MAGNESIUM SERPL-MCNC: 2.1 MG/DL (ref 1.6–2.6)
MCH RBC QN AUTO: 32.9 PG (ref 25.2–33.5)
MCHC RBC AUTO-ENTMCNC: 32.4 G/DL (ref 28.4–34.8)
MCV RBC AUTO: 101.4 FL (ref 82.6–102.9)
MICROORGANISM SPEC CULT: ABNORMAL
MICROORGANISM SPEC CULT: ABNORMAL
MICROORGANISM/AGENT SPEC: ABNORMAL
MICROORGANISM/AGENT SPEC: ABNORMAL
MONOCYTES NFR BLD: 0.42 K/UL (ref 0.1–1.2)
MONOCYTES NFR BLD: 7 % (ref 3–12)
NEUTROPHILS NFR BLD: 70 % (ref 36–65)
NEUTS SEG NFR BLD: 4.48 K/UL (ref 1.5–8.1)
NRBC BLD-RTO: 0 PER 100 WBC
PLATELET # BLD AUTO: 207 K/UL (ref 138–453)
PMV BLD AUTO: 9 FL (ref 8.1–13.5)
POTASSIUM SERPL-SCNC: 3.4 MMOL/L (ref 3.7–5.3)
RBC # BLD AUTO: 4.2 M/UL (ref 4.21–5.77)
SODIUM SERPL-SCNC: 136 MMOL/L (ref 135–144)
SPECIMEN DESCRIPTION: ABNORMAL
WBC OTHER # BLD: 6.3 K/UL (ref 3.5–11.3)

## 2023-09-15 PROCEDURE — 2580000003 HC RX 258: Performed by: STUDENT IN AN ORGANIZED HEALTH CARE EDUCATION/TRAINING PROGRAM

## 2023-09-15 PROCEDURE — 36415 COLL VENOUS BLD VENIPUNCTURE: CPT

## 2023-09-15 PROCEDURE — 2580000003 HC RX 258

## 2023-09-15 PROCEDURE — 6360000002 HC RX W HCPCS

## 2023-09-15 PROCEDURE — 99232 SBSQ HOSP IP/OBS MODERATE 35: CPT | Performed by: INTERNAL MEDICINE

## 2023-09-15 PROCEDURE — 6370000000 HC RX 637 (ALT 250 FOR IP)

## 2023-09-15 PROCEDURE — 71045 X-RAY EXAM CHEST 1 VIEW: CPT

## 2023-09-15 PROCEDURE — 83735 ASSAY OF MAGNESIUM: CPT

## 2023-09-15 PROCEDURE — 85025 COMPLETE CBC W/AUTO DIFF WBC: CPT

## 2023-09-15 PROCEDURE — 2700000000 HC OXYGEN THERAPY PER DAY

## 2023-09-15 PROCEDURE — 94640 AIRWAY INHALATION TREATMENT: CPT

## 2023-09-15 PROCEDURE — 99254 IP/OBS CNSLTJ NEW/EST MOD 60: CPT | Performed by: NURSE PRACTITIONER

## 2023-09-15 PROCEDURE — 80048 BASIC METABOLIC PNL TOTAL CA: CPT

## 2023-09-15 PROCEDURE — 99232 SBSQ HOSP IP/OBS MODERATE 35: CPT | Performed by: OTOLARYNGOLOGY

## 2023-09-15 PROCEDURE — 2060000000 HC ICU INTERMEDIATE R&B

## 2023-09-15 PROCEDURE — 94761 N-INVAS EAR/PLS OXIMETRY MLT: CPT

## 2023-09-15 RX ORDER — POTASSIUM CHLORIDE 20 MEQ/1
40 TABLET, EXTENDED RELEASE ORAL ONCE
Status: COMPLETED | OUTPATIENT
Start: 2023-09-15 | End: 2023-09-15

## 2023-09-15 RX ADMIN — AMPICILLIN SODIUM AND SULBACTAM SODIUM 3000 MG: 2; 1 INJECTION, POWDER, FOR SOLUTION INTRAMUSCULAR; INTRAVENOUS at 17:17

## 2023-09-15 RX ADMIN — TRAZODONE HYDROCHLORIDE 100 MG: 100 TABLET ORAL at 21:18

## 2023-09-15 RX ADMIN — POTASSIUM CHLORIDE 40 MEQ: 1500 TABLET, EXTENDED RELEASE ORAL at 07:58

## 2023-09-15 RX ADMIN — SODIUM CHLORIDE, PRESERVATIVE FREE 10 ML: 5 INJECTION INTRAVENOUS at 07:15

## 2023-09-15 RX ADMIN — AMPICILLIN SODIUM AND SULBACTAM SODIUM 3000 MG: 2; 1 INJECTION, POWDER, FOR SOLUTION INTRAMUSCULAR; INTRAVENOUS at 05:55

## 2023-09-15 RX ADMIN — Medication 100 MG: at 07:15

## 2023-09-15 RX ADMIN — IPRATROPIUM BROMIDE AND ALBUTEROL SULFATE 1 DOSE: 2.5; .5 SOLUTION RESPIRATORY (INHALATION) at 12:24

## 2023-09-15 RX ADMIN — VANCOMYCIN HYDROCHLORIDE 1250 MG: 1.25 INJECTION, POWDER, LYOPHILIZED, FOR SOLUTION INTRAVENOUS at 18:59

## 2023-09-15 RX ADMIN — Medication 1 TABLET: at 07:15

## 2023-09-15 RX ADMIN — IPRATROPIUM BROMIDE AND ALBUTEROL SULFATE 1 DOSE: 2.5; .5 SOLUTION RESPIRATORY (INHALATION) at 08:42

## 2023-09-15 RX ADMIN — IPRATROPIUM BROMIDE AND ALBUTEROL SULFATE 1 DOSE: 2.5; .5 SOLUTION RESPIRATORY (INHALATION) at 16:17

## 2023-09-15 RX ADMIN — IPRATROPIUM BROMIDE AND ALBUTEROL SULFATE 1 DOSE: 2.5; .5 SOLUTION RESPIRATORY (INHALATION) at 19:51

## 2023-09-15 RX ADMIN — SODIUM CHLORIDE, PRESERVATIVE FREE 10 ML: 5 INJECTION INTRAVENOUS at 07:16

## 2023-09-15 RX ADMIN — VANCOMYCIN HYDROCHLORIDE 1250 MG: 1.25 INJECTION, POWDER, LYOPHILIZED, FOR SOLUTION INTRAVENOUS at 07:20

## 2023-09-15 RX ADMIN — FOLIC ACID 1 MG: 1 TABLET ORAL at 07:15

## 2023-09-15 RX ADMIN — AMPICILLIN SODIUM AND SULBACTAM SODIUM 3000 MG: 2; 1 INJECTION, POWDER, FOR SOLUTION INTRAMUSCULAR; INTRAVENOUS at 11:19

## 2023-09-15 RX ADMIN — AMPICILLIN SODIUM AND SULBACTAM SODIUM 3000 MG: 2; 1 INJECTION, POWDER, FOR SOLUTION INTRAMUSCULAR; INTRAVENOUS at 00:04

## 2023-09-15 RX ADMIN — SODIUM CHLORIDE, PRESERVATIVE FREE 10 ML: 5 INJECTION INTRAVENOUS at 21:18

## 2023-09-15 RX ADMIN — AMPICILLIN SODIUM AND SULBACTAM SODIUM 3000 MG: 2; 1 INJECTION, POWDER, FOR SOLUTION INTRAMUSCULAR; INTRAVENOUS at 23:56

## 2023-09-15 ASSESSMENT — PAIN SCALES - GENERAL: PAINLEVEL_OUTOF10: 0

## 2023-09-15 NOTE — PLAN OF CARE
Problem: Discharge Planning  Goal: Discharge to home or other facility with appropriate resources  9/15/2023 0901 by Jerry Jauregui RN  Outcome: Progressing  9/15/2023 0031 by Loren Quick RN  Outcome: Progressing     Problem: Pain  Goal: Verbalizes/displays adequate comfort level or baseline comfort level  9/15/2023 0901 by Jerry Jauregui RN  Outcome: Progressing  9/15/2023 0031 by Loren Quick RN  Outcome: Progressing     Problem: Skin/Tissue Integrity  Goal: Absence of new skin breakdown  Description: 1. Monitor for areas of redness and/or skin breakdown  2. Assess vascular access sites hourly  3. Every 4-6 hours minimum:  Change oxygen saturation probe site  4. Every 4-6 hours:  If on nasal continuous positive airway pressure, respiratory therapy assess nares and determine need for appliance change or resting period.   9/15/2023 0901 by Jerry Jauregui RN  Outcome: Progressing  9/15/2023 0031 by Loren Quick RN  Outcome: Progressing     Problem: Safety - Adult  Goal: Free from fall injury  Outcome: Progressing     Problem: ABCDS Injury Assessment  Goal: Absence of physical injury  Outcome: Progressing     Problem: Respiratory - Adult  Goal: Achieves optimal ventilation and oxygenation  9/15/2023 0901 by Jerry Jauregui RN  Outcome: Progressing  9/15/2023 0846 by Jamshid Elizabeth RCP  Outcome: Progressing  9/14/2023 1957 by Sj eKenan RCP  Outcome: Progressing

## 2023-09-15 NOTE — PLAN OF CARE
Problem: Respiratory - Adult  Goal: Achieves optimal ventilation and oxygenation  9/15/2023 0846 by Andrei Camarillo RCP  Outcome: Progressing   BRONCHOSPASM/BRONCHOCONSTRICTION     [x]         IMPROVE AERATION/BREATH SOUNDS  [x]   ADMINISTER BRONCHODILATOR THERAPY AS APPROPRIATE  [x]   ASSESS BREATH SOUNDS  []   IMPLEMENT AEROSOL/MDI PROTOCOL  [x]   PATIENT EDUCATION AS NEEDED   PROVIDE ADEQUATE OXYGENATION WITH ACCEPTABLE SP02/ABG'S    [x]  IDENTIFY APPROPRIATE OXYGEN THERAPY  [x]   MONITOR SP02/ABG'S AS NEEDED   [x]   PATIENT EDUCATION AS NEEDED

## 2023-09-15 NOTE — CONSULTS
[facility] Cardiothoracic Surgery  Consult    Patient's Name/Date of Birth: Jill Mckeon / 1975 (26 y.o.)    Date: September 15, 2023     Chief Complaint: Right-sided pneumothorax    HPI: Jill Mckeon is a 51 y.o.  male who presented to cardiothoracic surgery with a right-sided pneumothorax with chest tube in place. Patient is a very malnourished individual cachectic in appearance. Patient came for drainage of neck abscess due to poor dental health. He is currently on the verge of homelessness. Family has stepped in to assist with his ADLs. He had a history of smoking 3 packs of cigarettes a day along with marijuana. Denies any drug abuse  IV. Daily drinker of beer throughout the day. He presented to the hospital with abrupt onset of shortness of breath. Was not exerting himself. After further work-up patient noted to have a pneumothorax which warranted for CT chest.  Patient noted to have many blebs throughout his lung fields. Currently resting in bed he has no chest pain or shortness of breath. He is on nasal cannula oxygen. Coughing up yellow sputum. No known history of heart disease. Strong family history of heart disease. ROS:   CONSTITUTIONAL: Cachectic in appearance.   Poor overall hygiene  Respiratory: negative  Cardiovascular: negative  Gastrointestinal: negative  Genitourinary:negative  Hematologic/lymphatic: negative  Musculoskeletal:negative  Neurological: negative  Endocrine: negative  Psychiatric: negative  Past Medical History:   Diagnosis Date    COPD (chronic obstructive pulmonary disease) ([address])     Hypertension      Past Surgical History:   Procedure Laterality Date    INCISION AND DRAINAGE OF NECK ABSCESS  09/12/2023    IR CHEST TUBE INSERTION  9/14/2023    IR CHEST TUBE INSERTION 9/14/2023 Tutu Salazar MD [facility]    NECK SURGERY Bilateral 9/12/2023    I AND D NECK ABSCESS performed by Clementeen Spurling, MD at [address]     No Known with marked pulmonary hyperinflation. There are multiple medium sized pulmonary bullae in right pulmonary upper  lobe including the upper and medial aspect of right upper lobe and in the  right perihilar area. There is a small right apical residual pneumothorax. At the medial aspect of  the right middle lobe the largest pocket of the pneumothorax measures 3.46 cm  in diameter. The pneumothorax continues anterior to right middle lobe. Superiorly the pneumothorax continues medial to right upper lobe and around  apex of right lung to lateral aspect of right upper lobe. There has been  re-expansion of the right lung. Only mild subsegmental atelectatic changes  can be identified in the right pulmonary lower lobe. Also evidence of focal dense infiltrates in the posterior segment of right  pulmonary upper lobe. Bullous emphysematous changes in the apex of left lung. No infiltrates or  atelectasis in left lung. No left pleural effusion. No evidence of left pneumothorax. Prior Labs reviewed:   Normal CBC. Normal BMP. No leukocytosis. Renal function is normal.    Assessment   Patient Active Problem List   Diagnosis    Submandibular abscess    Pneumothorax on right    Hypokalemia    Dental cavities    COPD (chronic obstructive pulmonary disease) (HCC)    Anxiety    Hyponatremia    Hypochloremia    Bandemia    Leukemoid reaction    Hypertension    Alcohol dependence ([address])    Dental infection    Abscess, neck    Primary spontaneous pneumothorax       Risks Reviewed w/pt  No plans for surgery at this time  PLAN:  Currently patient would make a poor surgical candidate at this time. We recommend keeping the chest tube placed to a -30 suction and monitoring for improved air leak. We will evaluate throughout the weekend to increase the suction. If there is still a pneumothorax we may evaluate for bedside talc pleurodesis as he is not a good surgical candidate.   We will try conservative treatments

## 2023-09-15 NOTE — PROGRESS NOTES
[facility]  Internal Medicine Teaching Residency Program  Inpatient Daily Progress Note  ______________________________________________________________________________    Patient: Yaakov Alanis  YOB: 1975   [id number]    Acct: [de-identified]     Room: [facility]-0  Admit date: 9/12/2023  Today's date: 09/15/23  Number of days in the hospital: 3    SUBJECTIVE   Admitting Diagnosis: Submandibular abscess  CC: confusion  Pt examined at bedside. Chart & results reviewed. CT up-sized yesterday due to worsening PTX; mild improvement on imaging today. Patient continues to have an air leak. Chest tube on -30 suction. On 3L NC, SpO2 97. Vital signs stable. He reports no complaints. ROS:  Constitutional:  negative for chills, fevers, sweats  Respiratory:  negative for cough, dyspnea on exertion, hemoptysis, shortness of breath, wheezing  Cardiovascular:  negative for chest pain, chest pressure/discomfort, lower extremity edema, palpitations  Gastrointestinal:  negative for abdominal pain, constipation, diarrhea, nausea, vomiting  Neurological:  negative for dizziness, headache  BRIEF HISTORY     The patient is a 51 y.o. male with past medical history significant for smoking, COPD, and alcohol use who was initially admitted on 9/12/2023 with Neck abscess [L02.11]  Submandibular abscess [K12.2]  Primary spontaneous pneumothorax [J93.11] and presented to [facility] on 9/12 with abrupt onset SOB. Tachycardic, -130. CT chest showed a 3.46 cm right middle lobe PTX with mediastinal shift. Right-sided chest tube placed. Patient placed on a nonbreather mask. Patient also reported a large mass on her chin that had been expanding in size for the past 3-4 days, which she worried may have been cancer due to her father having a similar presentation at the time of his neck cancer diagnosis.  CT showed large BL submandibular abscesses, 4cm on the left and 4.2cm on glycol, 17 g, Daily PRN  acetaminophen, 650 mg, Q6H PRN   Or  acetaminophen, 650 mg, Q6H PRN  magnesium sulfate, 2,000 mg, PRN  sodium chloride flush, 5-40 mL, PRN  sodium chloride, , PRN  HYDROmorphone, 0.25 mg, Q5 Min PRN  HYDROmorphone, 0.5 mg, Q5 Min PRN  hydrALAZINE, 10 mg, Q15 Min PRN  morphine, 2 mg, Q8H PRN        Diagnostic Labs:  CBC:   Recent Labs     09/13/23  0506 09/14/23  0224 09/15/23  0418   WBC 12.5* 6.6 6.3   RBC 4.30 3.81* 4.20*   HGB 14.5 12.7* 13.8   HCT 43.9 39.2* 42.6   .1 102.9 101.4   RDW 12.6 12.8 12.6    194 207     BMP:   Recent Labs     09/13/23  0506 09/14/23  0224 09/15/23  0418    136 136   K 4.0 3.2* 3.4*    104 101   CO2 25 27 27   BUN 9 8 4*   CREATININE 0.4* 0.4* 0.4*     BNP: No results for input(s): \"BNP\" in the last 72 hours. PT/INR:   No results for input(s): \"PROTIME\", \"INR\" in the last 72 hours. APTT:   No results for input(s): \"APTT\" in the last 72 hours. CARDIAC ENZYMES: No results for input(s): \"CKMB\", \"CKMBINDEX\", \"TROPONINI\" in the last 72 hours. Invalid input(s): \"CKTOTAL;3\"  FASTING LIPID PANEL:No results found for: \"CHOL\", \"HDL\", \"TRIG\"  LIVER PROFILE:   Recent Labs     09/13/23 0506 09/14/23 0224   AST 13 15   ALT 7 7   BILITOT 0.3 0.4   ALKPHOS 75 65      MICROBIOLOGY:   Lab Results   Component Value Date/Time    CULTURE NO GROWTH 2 DAYS 09/12/2023 06:51 PM       Imaging:    XR CHEST PORTABLE    Result Date: 9/14/2023  Small bore right chest tube remains in place. There is now a moderate right pneumothorax and increasing soft tissue emphysema right chest wall. Findings were discussed with Dr. Jeanne Carr and Dr. Jeanna Croft at 10:04 am on 9/14/2023. XR CHEST PORTABLE    Result Date: 9/13/2023  Small bore right chest tube with right apical pneumothorax measuring 1.3 cm. This is increased since prior. New right lower lobe airspace consolidation with small right pleural effusion. Findings may represent pneumonia.      CT CHEST W

## 2023-09-15 NOTE — PROGRESS NOTES
ENT/OTOLARYNGOLOGY SUBSEQUENT CARE PROGRESS NOTE     REASON FOR CARE: Neck Abscess from Dental Infection     HISTORY OF PRESENT ILLNESS:   Yaakov Alanis is a 51 y.o. who is being seen for follow-up of neck abscess from dental infection, s/p I and D    Pertinent Examination:   MOUTH/THROAT: mucous membranes moist, no focal lesions, no tonsillar enlargement or exudate; poor dentition  NECK: 2 penrose drains in place, working, soft flaps, both swelling and drainage reducing     RELEVANT LABS/STUDIES:   Additional data reviewed:          Direct Exam MANY NEUTROPHILS Abnormal  P      MIXED BACTERIAL MORPHOTYPES SEEN ON GRAM STAIN. Abnormal  P          Procedures:    None  Surgical risk factors:  COPD, HTN     IMPRESSION AND RECOMMENDATIONS:   Yaakov Alanis is a 51 y.o. male with odontogenic source of bilateral neck abscesses, L>>R.  S/p incision and drainage on 9/12, clinically improving     Plan:  Continue IV ABX  Await final culture results  Change neck gauze dressing daily and as needed when saturated  Will be very conservative on drain removal as abscess cavities are large L>>R  Will likely remove the drain on right side as early as Saturday AM, but timing of left removal will depend on clinical progress (likely Monday)  Discussed importance of dental/oral surgery follow-up after discharge home           Lissa Camarillo MD  Pediatric Otolaryngology-Head and [facility] Otolaryngology group     Office  ph# [phone]    Also available in [address] [place] [address]

## 2023-09-16 ENCOUNTER — APPOINTMENT (OUTPATIENT)
Dept: GENERAL RADIOLOGY | Age: 48
DRG: 364 | End: 2023-09-16
Payer: MEDICAID

## 2023-09-16 LAB
ANION GAP SERPL CALCULATED.3IONS-SCNC: 5 MMOL/L (ref 9–17)
BUN SERPL-MCNC: 5 MG/DL (ref 6–20)
CALCIUM SERPL-MCNC: 8.7 MG/DL (ref 8.6–10.4)
CHLORIDE SERPL-SCNC: 103 MMOL/L (ref 98–107)
CO2 SERPL-SCNC: 30 MMOL/L (ref 20–31)
CREAT SERPL-MCNC: 0.3 MG/DL (ref 0.7–1.2)
GFR SERPL CREATININE-BSD FRML MDRD: >60 ML/MIN/1.73M2
GLUCOSE SERPL-MCNC: 110 MG/DL (ref 70–99)
POTASSIUM SERPL-SCNC: 3.6 MMOL/L (ref 3.7–5.3)
SODIUM SERPL-SCNC: 138 MMOL/L (ref 135–144)

## 2023-09-16 PROCEDURE — 36415 COLL VENOUS BLD VENIPUNCTURE: CPT

## 2023-09-16 PROCEDURE — 6360000002 HC RX W HCPCS

## 2023-09-16 PROCEDURE — 80048 BASIC METABOLIC PNL TOTAL CA: CPT

## 2023-09-16 PROCEDURE — 2700000000 HC OXYGEN THERAPY PER DAY

## 2023-09-16 PROCEDURE — 71045 X-RAY EXAM CHEST 1 VIEW: CPT

## 2023-09-16 PROCEDURE — 99232 SBSQ HOSP IP/OBS MODERATE 35: CPT | Performed by: STUDENT IN AN ORGANIZED HEALTH CARE EDUCATION/TRAINING PROGRAM

## 2023-09-16 PROCEDURE — 2580000003 HC RX 258

## 2023-09-16 PROCEDURE — 99231 SBSQ HOSP IP/OBS SF/LOW 25: CPT | Performed by: NURSE PRACTITIONER

## 2023-09-16 PROCEDURE — 94761 N-INVAS EAR/PLS OXIMETRY MLT: CPT

## 2023-09-16 PROCEDURE — 6370000000 HC RX 637 (ALT 250 FOR IP)

## 2023-09-16 PROCEDURE — 99232 SBSQ HOSP IP/OBS MODERATE 35: CPT | Performed by: INTERNAL MEDICINE

## 2023-09-16 PROCEDURE — 2060000000 HC ICU INTERMEDIATE R&B

## 2023-09-16 PROCEDURE — 94640 AIRWAY INHALATION TREATMENT: CPT

## 2023-09-16 RX ORDER — POTASSIUM CHLORIDE 20 MEQ/1
40 TABLET, EXTENDED RELEASE ORAL ONCE
Status: COMPLETED | OUTPATIENT
Start: 2023-09-16 | End: 2023-09-16

## 2023-09-16 RX ADMIN — Medication 1 TABLET: at 09:25

## 2023-09-16 RX ADMIN — AMPICILLIN SODIUM AND SULBACTAM SODIUM 3000 MG: 2; 1 INJECTION, POWDER, FOR SOLUTION INTRAMUSCULAR; INTRAVENOUS at 18:24

## 2023-09-16 RX ADMIN — IPRATROPIUM BROMIDE AND ALBUTEROL SULFATE 1 DOSE: 2.5; .5 SOLUTION RESPIRATORY (INHALATION) at 19:45

## 2023-09-16 RX ADMIN — POTASSIUM CHLORIDE 40 MEQ: 1500 TABLET, EXTENDED RELEASE ORAL at 09:25

## 2023-09-16 RX ADMIN — IPRATROPIUM BROMIDE AND ALBUTEROL SULFATE 1 DOSE: 2.5; .5 SOLUTION RESPIRATORY (INHALATION) at 15:30

## 2023-09-16 RX ADMIN — Medication 100 MG: at 09:25

## 2023-09-16 RX ADMIN — ENOXAPARIN SODIUM 40 MG: 100 INJECTION SUBCUTANEOUS at 09:26

## 2023-09-16 RX ADMIN — AMPICILLIN SODIUM AND SULBACTAM SODIUM 3000 MG: 2; 1 INJECTION, POWDER, FOR SOLUTION INTRAMUSCULAR; INTRAVENOUS at 06:03

## 2023-09-16 RX ADMIN — IPRATROPIUM BROMIDE AND ALBUTEROL SULFATE 1 DOSE: 2.5; .5 SOLUTION RESPIRATORY (INHALATION) at 11:37

## 2023-09-16 RX ADMIN — MORPHINE SULFATE 2 MG: 4 INJECTION INTRAVENOUS at 16:37

## 2023-09-16 RX ADMIN — FOLIC ACID 1 MG: 1 TABLET ORAL at 09:25

## 2023-09-16 RX ADMIN — VANCOMYCIN HYDROCHLORIDE 1250 MG: 1.25 INJECTION, POWDER, LYOPHILIZED, FOR SOLUTION INTRAVENOUS at 19:20

## 2023-09-16 RX ADMIN — VANCOMYCIN HYDROCHLORIDE 1250 MG: 1.25 INJECTION, POWDER, LYOPHILIZED, FOR SOLUTION INTRAVENOUS at 09:24

## 2023-09-16 RX ADMIN — AMPICILLIN SODIUM AND SULBACTAM SODIUM 3000 MG: 2; 1 INJECTION, POWDER, FOR SOLUTION INTRAMUSCULAR; INTRAVENOUS at 12:11

## 2023-09-16 RX ADMIN — IPRATROPIUM BROMIDE AND ALBUTEROL SULFATE 1 DOSE: 2.5; .5 SOLUTION RESPIRATORY (INHALATION) at 08:00

## 2023-09-16 ASSESSMENT — PAIN DESCRIPTION - DESCRIPTORS: DESCRIPTORS: BURNING;JABBING

## 2023-09-16 ASSESSMENT — PAIN - FUNCTIONAL ASSESSMENT: PAIN_FUNCTIONAL_ASSESSMENT: PREVENTS OR INTERFERES SOME ACTIVE ACTIVITIES AND ADLS

## 2023-09-16 ASSESSMENT — PAIN DESCRIPTION - LOCATION: LOCATION: FLANK

## 2023-09-16 ASSESSMENT — PAIN DESCRIPTION - PAIN TYPE: TYPE: ACUTE PAIN

## 2023-09-16 ASSESSMENT — PAIN DESCRIPTION - FREQUENCY: FREQUENCY: INTERMITTENT

## 2023-09-16 ASSESSMENT — PAIN DESCRIPTION - ORIENTATION: ORIENTATION: RIGHT

## 2023-09-16 ASSESSMENT — PAIN SCALES - GENERAL: PAINLEVEL_OUTOF10: 10

## 2023-09-16 NOTE — PLAN OF CARE
Problem: Discharge Planning  Goal: Discharge to home or other facility with appropriate resources  Outcome: Progressing  Flowsheets (Taken 9/16/2023 0900)  Discharge to home or other facility with appropriate resources:   Identify barriers to discharge with patient and caregiver   Arrange for needed discharge resources and transportation as appropriate   Identify discharge learning needs (meds, wound care, etc)   Arrange for interpreters to assist at discharge as needed   Refer to discharge planning if patient needs post-hospital services based on physician order or complex needs related to functional status, cognitive ability or social support system     Problem: Pain  Goal: Verbalizes/displays adequate comfort level or baseline comfort level  Outcome: Progressing     Problem: Skin/Tissue Integrity  Goal: Absence of new skin breakdown  Description: 1. Monitor for areas of redness and/or skin breakdown  2. Assess vascular access sites hourly  3. Every 4-6 hours minimum:  Change oxygen saturation probe site  4. Every 4-6 hours:  If on nasal continuous positive airway pressure, respiratory therapy assess nares and determine need for appliance change or resting period.   Outcome: Progressing     Problem: Safety - Adult  Goal: Free from fall injury  Outcome: Progressing     Problem: ABCDS Injury Assessment  Goal: Absence of physical injury  Outcome: Progressing     Problem: Respiratory - Adult  Goal: Achieves optimal ventilation and oxygenation  9/16/2023 1810 by Chetna Hagen RN  Outcome: Progressing  9/16/2023 0804 by Annette Somers RCP  Outcome: Progressing

## 2023-09-16 NOTE — PROGRESS NOTES
ENT/OTOLARYNGOLOGY SUBSEQUENT CARE PROGRESS NOTE     REASON FOR CARE: Neck Abscess from Dental Infection     HISTORY OF PRESENT ILLNESS:   Dimas Manriquez is a 51 y.o. who is being seen for follow-up of neck abscess from dental infection, s/p I and D    Subjective:  No acute changes. Pain well controlled. Feels swelling is at its best since admission    Pertinent Examination:   MOUTH/THROAT: mucous membranes moist, no focal lesions, no tonsillar enlargement or exudate; poor dentition  NECK: 2 penrose drains in place, working, soft flaps, both swelling and drainage minimal at this time. Right drain removed     RELEVANT LABS/STUDIES:   Additional data reviewed:    Cultures with prevotella species, beta-lactamase positive    CBC:      Latest Reference Range & Units 09/12/23 03:55 09/13/23 05:06 09/14/23 02:24 09/15/23 04:18   WBC 3.5 - 11.3 k/uL 15.4 (H) 12.5 (H) 6.6 6.3   RBC 4.21 - 5.77 m/uL 5.17 4.30 3.81 (L) 4.20 (L)   Hemoglobin Quant 13.0 - 17.0 g/dL 17.3 (H) 14.5 12.7 (L) 13.8   Hematocrit 40.7 - 50.3 % 50.9 (H) 43.9 39.2 (L) 42.6   MCV 82.6 - 102.9 fL 98.5 102.1 102.9 101.4   MCH 25.2 - 33.5 pg 33.5 33.7 (H) 33.3 32.9   MCHC 28.4 - 34.8 g/dL 34.0 33.0 32.4 32.4   MPV 8.1 - 13.5 fL 9.2 9.3 9.0 9.0   RDW 11.8 - 14.4 % 12.3 12.6 12.8 12.6   Platelet Count 002 - 453 k/uL 298 223 194 207   (H): Data is abnormally high  (L): Data is abnormally low    Procedures:    None  Surgical risk factors:  COPD, HTN     IMPRESSION AND RECOMMENDATIONS:   Dimas Manriquez is a 51 y.o. male with odontogenic source of bilateral neck abscesses, L>>R.  S/p incision and drainage on 9/12, clinically improving     Plan:  Continue IV ABX, Vanc/Unasyn is reasonable for dental infection with pneumonia  Cultures with beta-lactamase positive prevotella; caution with narrowing abx coverage too early as oral abscesses are often poly-microbial  Change neck gauze dressing daily and as needed when saturated  Will be very conservative on drain removal as abscess cavities are large L>>R  Right drain removed today; will remove left drain tomorrow vs. Monday depending on clinical course  Discussed importance of dental/oral surgery follow-up after discharge home  Will follow closely     Tyler Vázquez MD MPH  Pediatric Otolaryngology-Head and Neck Surgery  [address] [place] [address] Otolaryngology group     Office  ph# [phone]    Also available in [address] [place] [address]

## 2023-09-16 NOTE — PROGRESS NOTES
[facility]  Internal Medicine Teaching Residency Program  Inpatient Daily Progress Note  ______________________________________________________________________________    Patient: Erika Midget  YOB: 1975   [id number]    Acct: [de-identified]     Room: [facility]-0  Admit date: 9/12/2023  Today's date: 09/16/23  Number of days in the hospital: 4    SUBJECTIVE   Admitting Diagnosis: Submandibular abscess  CC: confusion  Pt examined at bedside. Chart & results reviewed. No acute overnight events. Patient is hemodynamically stable, afebrile, breathing on 2 L nasal cannula, saturating well. Chest tube continues to be on -30 suction. Air bubbles has considerably reduced. CT surgery following. Patient is educated and encouraged to continue using Acapella and incentive spirometry. Submandibular bilateral drains in place. ENT following      ROS:  Constitutional:  negative for chills, fevers, sweats  Respiratory:  negative for cough, dyspnea on exertion, hemoptysis, shortness of breath, wheezing  Cardiovascular:  negative for chest pain, chest pressure/discomfort, lower extremity edema, palpitations  Gastrointestinal:  negative for abdominal pain, constipation, diarrhea, nausea, vomiting  Neurological:  negative for dizziness, headache  BRIEF HISTORY     The patient is a 51 y.o. male with past medical history significant for smoking, COPD, and alcohol use who was initially admitted on 9/12/2023 with Neck abscess [L02.11]  Submandibular abscess [K12.2]  Primary spontaneous pneumothorax [J93.11] and presented to [facility] on 9/12 with abrupt onset SOB. Tachycardic, -130. CT chest showed a 3.46 cm right middle lobe PTX with mediastinal shift. Right-sided chest tube placed. Patient placed on a nonbreather mask.      Patient also reported a large mass on her chin that had been expanding in size for the past 3-4 days, which she worried may have been cancer Findings may represent pneumonia. CT CHEST W CONTRAST    Result Date: 9/12/2023  Evidence of advanced COPD with marked pulmonary hyperinflation. There are multiple medium sized pulmonary bullae in right pulmonary upper lobe including the upper and medial aspect of right upper lobe and in the right perihilar area. There is a small right apical residual pneumothorax. At the medial aspect of the right middle lobe the largest pocket of the pneumothorax measures 3.46 cm in diameter. The pneumothorax continues anterior to right middle lobe. Superiorly the pneumothorax continues medial to right upper lobe and around apex of right lung to lateral aspect of right upper lobe. There has been re-expansion of the right lung. Only mild subsegmental atelectatic changes can be identified in the right pulmonary lower lobe. Also evidence of focal dense infiltrates in the posterior segment of right pulmonary upper lobe. Bullous emphysematous changes in the apex of left lung. No infiltrates or atelectasis in left lung. No left pleural effusion. No evidence of left pneumothorax. XR CHEST PORTABLE    Result Date: 9/12/2023  Evidence right chest tube of small diameter extend into lateral aspect right upper lung. There is re-expansion of right lung. There appears to be a tiny left apical pneumothorax. Evidence of advanced COPD with moderate to marked hyperinflation of lungs and right perihilar pulmonary bulla. CT FACIAL BONES W CONTRAST    Result Date: 9/12/2023  Large dental abscess measuring up to 4 cm in maximal dimension in the left submandibular area. Large dental abscess measuring up to 4.2 cm in diameter in right submandibular area extended to right submental area. Findings suggestive of dental root abscesses involving the left mandibular 2nd molar dental root and right mandibular 3rd molar dental root and these are likely to be the sources for submandibular abscess. Bilateral paranasal sinuses are clear.      XR

## 2023-09-16 NOTE — PLAN OF CARE
Concerns for apical pneumo on CXR. Please increase chest tube to -40 suction  Pt needs to be left on suction. Please have him use IS and acapella.

## 2023-09-16 NOTE — PLAN OF CARE
Problem: Respiratory - Adult  Goal: Achieves optimal ventilation and oxygenation  Outcome: Progressing   PROVIDE ADEQUATE OXYGENATION WITH ACCEPTABLE SP02/ABG'S    [x]  IDENTIFY APPROPRIATE OXYGEN THERAPY  [x]   MONITOR SP02/ABG'S AS NEEDED   [x]   PATIENT EDUCATION AS NEEDED   BRONCHOSPASM/BRONCHOCONSTRICTION     [x]         IMPROVE AERATION/BREATH SOUNDS  [x]   ADMINISTER BRONCHODILATOR THERAPY AS APPROPRIATE  [x]   ASSESS BREATH SOUNDS  [x]   IMPLEMENT AEROSOL/MDI PROTOCOL  [x]   PATIENT EDUCATION AS NEEDED

## 2023-09-17 ENCOUNTER — APPOINTMENT (OUTPATIENT)
Dept: GENERAL RADIOLOGY | Age: 48
DRG: 364 | End: 2023-09-17
Payer: MEDICAID

## 2023-09-17 LAB
ANION GAP SERPL CALCULATED.3IONS-SCNC: 7 MMOL/L (ref 9–17)
BUN SERPL-MCNC: 6 MG/DL (ref 6–20)
CALCIUM SERPL-MCNC: 8.7 MG/DL (ref 8.6–10.4)
CHLORIDE SERPL-SCNC: 101 MMOL/L (ref 98–107)
CO2 SERPL-SCNC: 26 MMOL/L (ref 20–31)
CREAT SERPL-MCNC: 0.4 MG/DL (ref 0.7–1.2)
GFR SERPL CREATININE-BSD FRML MDRD: >60 ML/MIN/1.73M2
GLUCOSE SERPL-MCNC: 101 MG/DL (ref 70–99)
MICROORGANISM SPEC CULT: NORMAL
POTASSIUM SERPL-SCNC: 4.3 MMOL/L (ref 3.7–5.3)
SERVICE CMNT-IMP: NORMAL
SODIUM SERPL-SCNC: 134 MMOL/L (ref 135–144)
SPECIMEN DESCRIPTION: NORMAL

## 2023-09-17 PROCEDURE — 80048 BASIC METABOLIC PNL TOTAL CA: CPT

## 2023-09-17 PROCEDURE — 71045 X-RAY EXAM CHEST 1 VIEW: CPT

## 2023-09-17 PROCEDURE — 94761 N-INVAS EAR/PLS OXIMETRY MLT: CPT

## 2023-09-17 PROCEDURE — 2700000000 HC OXYGEN THERAPY PER DAY

## 2023-09-17 PROCEDURE — 2060000000 HC ICU INTERMEDIATE R&B

## 2023-09-17 PROCEDURE — 94640 AIRWAY INHALATION TREATMENT: CPT

## 2023-09-17 PROCEDURE — 6370000000 HC RX 637 (ALT 250 FOR IP): Performed by: INTERNAL MEDICINE

## 2023-09-17 PROCEDURE — 6360000002 HC RX W HCPCS

## 2023-09-17 PROCEDURE — 2580000003 HC RX 258

## 2023-09-17 PROCEDURE — 6370000000 HC RX 637 (ALT 250 FOR IP)

## 2023-09-17 PROCEDURE — 99233 SBSQ HOSP IP/OBS HIGH 50: CPT | Performed by: HOSPITALIST

## 2023-09-17 PROCEDURE — 36415 COLL VENOUS BLD VENIPUNCTURE: CPT

## 2023-09-17 RX ORDER — IPRATROPIUM BROMIDE AND ALBUTEROL SULFATE 2.5; .5 MG/3ML; MG/3ML
1 SOLUTION RESPIRATORY (INHALATION)
Status: DISCONTINUED | OUTPATIENT
Start: 2023-09-17 | End: 2023-09-22 | Stop reason: HOSPADM

## 2023-09-17 RX ADMIN — ENOXAPARIN SODIUM 40 MG: 100 INJECTION SUBCUTANEOUS at 08:32

## 2023-09-17 RX ADMIN — TRAZODONE HYDROCHLORIDE 100 MG: 100 TABLET ORAL at 01:26

## 2023-09-17 RX ADMIN — IPRATROPIUM BROMIDE AND ALBUTEROL SULFATE 1 DOSE: 2.5; .5 SOLUTION RESPIRATORY (INHALATION) at 15:28

## 2023-09-17 RX ADMIN — IPRATROPIUM BROMIDE AND ALBUTEROL SULFATE 1 DOSE: 2.5; .5 SOLUTION RESPIRATORY (INHALATION) at 20:54

## 2023-09-17 RX ADMIN — AMPICILLIN SODIUM AND SULBACTAM SODIUM 3000 MG: 2; 1 INJECTION, POWDER, FOR SOLUTION INTRAMUSCULAR; INTRAVENOUS at 01:27

## 2023-09-17 RX ADMIN — AMPICILLIN SODIUM AND SULBACTAM SODIUM 3000 MG: 2; 1 INJECTION, POWDER, FOR SOLUTION INTRAMUSCULAR; INTRAVENOUS at 06:19

## 2023-09-17 RX ADMIN — FOLIC ACID 1 MG: 1 TABLET ORAL at 08:32

## 2023-09-17 RX ADMIN — AMPICILLIN SODIUM AND SULBACTAM SODIUM 3000 MG: 2; 1 INJECTION, POWDER, FOR SOLUTION INTRAMUSCULAR; INTRAVENOUS at 23:39

## 2023-09-17 RX ADMIN — AMPICILLIN SODIUM AND SULBACTAM SODIUM 3000 MG: 2; 1 INJECTION, POWDER, FOR SOLUTION INTRAMUSCULAR; INTRAVENOUS at 12:17

## 2023-09-17 RX ADMIN — TRAZODONE HYDROCHLORIDE 100 MG: 100 TABLET ORAL at 23:37

## 2023-09-17 RX ADMIN — IPRATROPIUM BROMIDE AND ALBUTEROL SULFATE 1 DOSE: 2.5; .5 SOLUTION RESPIRATORY (INHALATION) at 09:22

## 2023-09-17 RX ADMIN — Medication 100 MG: at 08:34

## 2023-09-17 RX ADMIN — VANCOMYCIN HYDROCHLORIDE 1250 MG: 1.25 INJECTION, POWDER, LYOPHILIZED, FOR SOLUTION INTRAVENOUS at 07:18

## 2023-09-17 RX ADMIN — AMPICILLIN SODIUM AND SULBACTAM SODIUM 3000 MG: 2; 1 INJECTION, POWDER, FOR SOLUTION INTRAMUSCULAR; INTRAVENOUS at 18:06

## 2023-09-17 RX ADMIN — VANCOMYCIN HYDROCHLORIDE 1250 MG: 1.25 INJECTION, POWDER, LYOPHILIZED, FOR SOLUTION INTRAVENOUS at 18:42

## 2023-09-17 RX ADMIN — Medication 1 TABLET: at 08:32

## 2023-09-17 ASSESSMENT — PAIN SCALES - GENERAL
PAINLEVEL_OUTOF10: 0
PAINLEVEL_OUTOF10: 0

## 2023-09-17 NOTE — PLAN OF CARE
Problem: Respiratory - Adult  Goal: Achieves optimal ventilation and oxygenation  Flowsheets (Taken 9/17/2023 4639)  Achieves optimal ventilation and oxygenation: Respiratory therapy support as indicated

## 2023-09-17 NOTE — PROGRESS NOTES
Attending Physician Statement  I have discussed the case, including pertinent history and exam findings with the resident and the team.  I have seen and examined the patient and the key elements of the encounter have been performed by me. I agree with the assessment, plan and orders as documented by the resident. In Brief:  Marsha Moreno is a 51 y.o. male, who is on day 5 of hospital stay, presented with Shortness of Breath  , found to have Submandibular abscess. Principal Problem:    Submandibular abscess  Active Problems:    Pneumothorax on right    Hypokalemia    Dental cavities    COPD (chronic obstructive pulmonary disease) (HCC)    Anxiety    Hyponatremia    Hypochloremia    Bandemia    Leukemoid reaction    Hypertension    Alcohol dependence ([address])    Dental infection    Abscess, neck    Primary spontaneous pneumothorax  Resolved Problems:    * No resolved hospital problems. *      Plan:     Patient seen and evaluated. Patient has bilateral submandibular gland abscesses. Patient underwent drainage. A drain was present on the left gland. Cultures grew Proteus. Continue Zosyn. Patient has pneumothorax with right-sided chest tubes in place. Continue suction. CT surgery is on board. ENT is on board.       Betty Woodard MD   Attending Physician, Internal Medicine Residency Program  9/17/2023, 3:53 PM

## 2023-09-17 NOTE — PLAN OF CARE
Problem: Discharge Planning  Goal: Discharge to home or other facility with appropriate resources  9/17/2023 0213 by Jazmín Anderson RN  Outcome: Progressing     Problem: Pain  Goal: Verbalizes/displays adequate comfort level or baseline comfort level  9/17/2023 0213 by Jazmín Anderson RN  Outcome: Progressing     Problem: Skin/Tissue Integrity  Goal: Absence of new skin breakdown  Description: 1. Monitor for areas of redness and/or skin breakdown  2. Assess vascular access sites hourly  3. Every 4-6 hours minimum:  Change oxygen saturation probe site  4. Every 4-6 hours:  If on nasal continuous positive airway pressure, respiratory therapy assess nares and determine need for appliance change or resting period.   9/17/2023 0213 by Jazmín Anderson RN  Outcome: Progressing

## 2023-09-17 NOTE — PROGRESS NOTES
[facility]  Internal Medicine Teaching Residency Program  Inpatient Daily Progress Note  ______________________________________________________________________________    Patient: Dimas Failing  YOB: 1975   [id number]    Acct: [de-identified]     Room: [facility]-0  Admit date: 9/12/2023  Today's date: 09/17/23  Number of days in the hospital: 5    SUBJECTIVE   Admitting Diagnosis: Submandibular abscess  CC: confusion  Pt examined at bedside. Chart & results reviewed. No acute overnight events. Afebrile. Vital signs stable. On 2L NC, saturating well. Patient reports SOB with exertion and chills. He continues to have an air leak, CTS following. Right CT output 400. Right submandibular drain removed yesterday via ENT. On unasyn and vancomycin for treatment of BL submandibular abscess and aspiration PNA. ROS:  Constitutional:  negative for chills, fevers, sweats  Respiratory:  negative for cough, dyspnea on exertion, hemoptysis, shortness of breath, wheezing  Cardiovascular:  negative for chest pain, chest pressure/discomfort, lower extremity edema, palpitations  Gastrointestinal:  negative for abdominal pain, constipation, diarrhea, nausea, vomiting  Neurological:  negative for dizziness, headache  BRIEF HISTORY     The patient is a 51 y.o. male with past medical history significant for smoking, COPD, and alcohol use who was initially admitted on 9/12/2023 with Neck abscess [L02.11]  Submandibular abscess [K12.2]  Primary spontaneous pneumothorax [J93.11] and presented to [facility] on 9/12 with abrupt onset SOB. Tachycardic, -130. CT chest showed a 3.46 cm right middle lobe PTX with mediastinal shift. Right-sided chest tube placed. Patient placed on a nonbreather mask.      Patient also reported a large mass on her chin that had been expanding in size for the past 3-4 days, which she worried may have been cancer due to her father having a

## 2023-09-17 NOTE — PLAN OF CARE
Problem: Discharge Planning  Goal: Discharge to home or other facility with appropriate resources  Outcome: Progressing     Problem: Pain  Goal: Verbalizes/displays adequate comfort level or baseline comfort level  Outcome: Progressing     Problem: Skin/Tissue Integrity  Goal: Absence of new skin breakdown  Description: 1. Monitor for areas of redness and/or skin breakdown  2. Assess vascular access sites hourly  3. Every 4-6 hours minimum:  Change oxygen saturation probe site  4. Every 4-6 hours:  If on nasal continuous positive airway pressure, respiratory therapy assess nares and determine need for appliance change or resting period.   Outcome: Progressing     Problem: Safety - Adult  Goal: Free from fall injury  Outcome: Progressing     Problem: ABCDS Injury Assessment  Goal: Absence of physical injury  Outcome: Progressing     Problem: Respiratory - Adult  Goal: Achieves optimal ventilation and oxygenation  9/17/2023 1856 by Andi Montilla RN  Outcome: Progressing  9/17/2023 0929 by Daryle Butt, RCP  Flowsheets (Taken 9/17/2023 8346)  Achieves optimal ventilation and oxygenation: Respiratory therapy support as indicated

## 2023-09-18 ENCOUNTER — APPOINTMENT (OUTPATIENT)
Dept: CT IMAGING | Age: 48
DRG: 364 | End: 2023-09-18
Payer: MEDICAID

## 2023-09-18 ENCOUNTER — APPOINTMENT (OUTPATIENT)
Dept: GENERAL RADIOLOGY | Age: 48
DRG: 364 | End: 2023-09-18
Payer: MEDICAID

## 2023-09-18 LAB
ANION GAP SERPL CALCULATED.3IONS-SCNC: 9 MMOL/L (ref 9–17)
BASOPHILS # BLD: <0.03 K/UL (ref 0–0.2)
BASOPHILS NFR BLD: 0 % (ref 0–2)
BUN SERPL-MCNC: 7 MG/DL (ref 6–20)
CALCIUM SERPL-MCNC: 8.9 MG/DL (ref 8.6–10.4)
CHLORIDE SERPL-SCNC: 99 MMOL/L (ref 98–107)
CO2 SERPL-SCNC: 26 MMOL/L (ref 20–31)
CREAT SERPL-MCNC: 0.4 MG/DL (ref 0.7–1.2)
EOSINOPHIL # BLD: 0.11 K/UL (ref 0–0.44)
EOSINOPHILS RELATIVE PERCENT: 2 % (ref 1–4)
ERYTHROCYTE [DISTWIDTH] IN BLOOD BY AUTOMATED COUNT: 12.5 % (ref 11.8–14.4)
GFR SERPL CREATININE-BSD FRML MDRD: >60 ML/MIN/1.73M2
GLUCOSE BLD-MCNC: 118 MG/DL (ref 75–110)
GLUCOSE SERPL-MCNC: 118 MG/DL (ref 70–99)
HCT VFR BLD AUTO: 46.3 % (ref 40.7–50.3)
HGB BLD-MCNC: 16 G/DL (ref 13–17)
IMM GRANULOCYTES # BLD AUTO: <0.03 K/UL (ref 0–0.3)
IMM GRANULOCYTES NFR BLD: 0 %
LYMPHOCYTES NFR BLD: 0.98 K/UL (ref 1.1–3.7)
LYMPHOCYTES RELATIVE PERCENT: 15 % (ref 24–43)
MCH RBC QN AUTO: 33.3 PG (ref 25.2–33.5)
MCHC RBC AUTO-ENTMCNC: 34.6 G/DL (ref 28.4–34.8)
MCV RBC AUTO: 96.5 FL (ref 82.6–102.9)
MONOCYTES NFR BLD: 0.46 K/UL (ref 0.1–1.2)
MONOCYTES NFR BLD: 7 % (ref 3–12)
NEUTROPHILS NFR BLD: 75 % (ref 36–65)
NEUTS SEG NFR BLD: 4.8 K/UL (ref 1.5–8.1)
NRBC BLD-RTO: 0 PER 100 WBC
PLATELET # BLD AUTO: 213 K/UL (ref 138–453)
PMV BLD AUTO: 9.2 FL (ref 8.1–13.5)
POTASSIUM SERPL-SCNC: 3.7 MMOL/L (ref 3.7–5.3)
RBC # BLD AUTO: 4.8 M/UL (ref 4.21–5.77)
SODIUM SERPL-SCNC: 134 MMOL/L (ref 135–144)
WBC OTHER # BLD: 6.4 K/UL (ref 3.5–11.3)

## 2023-09-18 PROCEDURE — 6360000002 HC RX W HCPCS

## 2023-09-18 PROCEDURE — 2580000003 HC RX 258

## 2023-09-18 PROCEDURE — 36415 COLL VENOUS BLD VENIPUNCTURE: CPT

## 2023-09-18 PROCEDURE — 71250 CT THORAX DX C-: CPT

## 2023-09-18 PROCEDURE — 80048 BASIC METABOLIC PNL TOTAL CA: CPT

## 2023-09-18 PROCEDURE — 6370000000 HC RX 637 (ALT 250 FOR IP)

## 2023-09-18 PROCEDURE — 99254 IP/OBS CNSLTJ NEW/EST MOD 60: CPT | Performed by: INTERNAL MEDICINE

## 2023-09-18 PROCEDURE — 99233 SBSQ HOSP IP/OBS HIGH 50: CPT | Performed by: INTERNAL MEDICINE

## 2023-09-18 PROCEDURE — 85025 COMPLETE CBC W/AUTO DIFF WBC: CPT

## 2023-09-18 PROCEDURE — 94640 AIRWAY INHALATION TREATMENT: CPT

## 2023-09-18 PROCEDURE — 2060000000 HC ICU INTERMEDIATE R&B

## 2023-09-18 PROCEDURE — 6370000000 HC RX 637 (ALT 250 FOR IP): Performed by: INTERNAL MEDICINE

## 2023-09-18 PROCEDURE — 2700000000 HC OXYGEN THERAPY PER DAY

## 2023-09-18 PROCEDURE — 82947 ASSAY GLUCOSE BLOOD QUANT: CPT

## 2023-09-18 PROCEDURE — 94761 N-INVAS EAR/PLS OXIMETRY MLT: CPT

## 2023-09-18 PROCEDURE — 71045 X-RAY EXAM CHEST 1 VIEW: CPT

## 2023-09-18 RX ORDER — ENOXAPARIN SODIUM 100 MG/ML
30 INJECTION SUBCUTANEOUS DAILY
Status: DISCONTINUED | OUTPATIENT
Start: 2023-09-19 | End: 2023-09-22 | Stop reason: HOSPADM

## 2023-09-18 RX ADMIN — SODIUM CHLORIDE, PRESERVATIVE FREE 10 ML: 5 INJECTION INTRAVENOUS at 20:39

## 2023-09-18 RX ADMIN — MORPHINE SULFATE 2 MG: 4 INJECTION INTRAVENOUS at 20:40

## 2023-09-18 RX ADMIN — IPRATROPIUM BROMIDE AND ALBUTEROL SULFATE 1 DOSE: 2.5; .5 SOLUTION RESPIRATORY (INHALATION) at 14:15

## 2023-09-18 RX ADMIN — VANCOMYCIN HYDROCHLORIDE 1250 MG: 1.25 INJECTION, POWDER, LYOPHILIZED, FOR SOLUTION INTRAVENOUS at 06:41

## 2023-09-18 RX ADMIN — ENOXAPARIN SODIUM 40 MG: 100 INJECTION SUBCUTANEOUS at 09:26

## 2023-09-18 RX ADMIN — FOLIC ACID 1 MG: 1 TABLET ORAL at 09:26

## 2023-09-18 RX ADMIN — AMPICILLIN SODIUM AND SULBACTAM SODIUM 3000 MG: 2; 1 INJECTION, POWDER, FOR SOLUTION INTRAMUSCULAR; INTRAVENOUS at 13:38

## 2023-09-18 RX ADMIN — IPRATROPIUM BROMIDE AND ALBUTEROL SULFATE 1 DOSE: 2.5; .5 SOLUTION RESPIRATORY (INHALATION) at 09:47

## 2023-09-18 RX ADMIN — AMPICILLIN SODIUM AND SULBACTAM SODIUM 3000 MG: 2; 1 INJECTION, POWDER, FOR SOLUTION INTRAMUSCULAR; INTRAVENOUS at 18:27

## 2023-09-18 RX ADMIN — VANCOMYCIN HYDROCHLORIDE 1250 MG: 1.25 INJECTION, POWDER, LYOPHILIZED, FOR SOLUTION INTRAVENOUS at 19:30

## 2023-09-18 RX ADMIN — Medication 1 TABLET: at 09:26

## 2023-09-18 RX ADMIN — Medication 100 MG: at 09:26

## 2023-09-18 RX ADMIN — IPRATROPIUM BROMIDE AND ALBUTEROL SULFATE 1 DOSE: 2.5; .5 SOLUTION RESPIRATORY (INHALATION) at 19:49

## 2023-09-18 RX ADMIN — AMPICILLIN SODIUM AND SULBACTAM SODIUM 3000 MG: 2; 1 INJECTION, POWDER, FOR SOLUTION INTRAMUSCULAR; INTRAVENOUS at 05:41

## 2023-09-18 ASSESSMENT — ENCOUNTER SYMPTOMS
APNEA: 0
ABDOMINAL DISTENTION: 0
EYE DISCHARGE: 0
COLOR CHANGE: 0

## 2023-09-18 ASSESSMENT — PAIN SCALES - GENERAL: PAINLEVEL_OUTOF10: 0

## 2023-09-18 NOTE — PROGRESS NOTES
Pharmacy Note     Enoxaparin Dose Adjustment    Frantz Caballero is a 51 y.o. male. Pharmacist assessment of enoxaparin dose for VTE prophylaxis. Recent Labs     09/17/23  0444 09/18/23  0859   BUN 6 7       Recent Labs     09/17/23  0444 09/18/23  0859   CREATININE 0.4* 0.4*       Estimated Creatinine Clearance: 144 mL/min (A) (based on SCr of 0.4 mg/dL (L)). Height:   Ht Readings from Last 1 Encounters:   09/18/23 6' (1.829 m)     Weight:  Wt Readings from Last 1 Encounters:   09/18/23 100 lb 9.6 oz (45.6 kg)       The following enoxaparin dose has been adjusted based upon renal function and/or patient weight per P&T Guidelines:             Lovenox 40mg SQ daily has been adjusted to   Lovenox 30mg SQ daily due to patient weigh < 50.9 kg.

## 2023-09-18 NOTE — PLAN OF CARE
Problem: Respiratory - Adult  Goal: Achieves optimal ventilation and oxygenation  9/18/2023 0951 by Alexis Cruz RCP  Flowsheets (Taken 9/18/2023 6016)  Achieves optimal ventilation and oxygenation: Respiratory therapy support as indicated

## 2023-09-18 NOTE — PROGRESS NOTES
Comprehensive Nutrition Assessment    Type and Reason for Visit:  RD Nutrition Re-Screen/LOS    Nutrition Recommendations/Plan:   Continue current diet, Easy to Chew  Continue high kcal/high PRO ONS TID (chocolate preferred)  Monitor/encourage PO intake     Malnutrition Assessment:  Malnutrition Status:  Insufficient data (09/18/23 1121)    Context:  Acute Illness     Findings of the 6 clinical characteristics of malnutrition:  Energy Intake:  Mild decrease in energy intake (Comment)  Weight Loss:  Unable to assess     Body Fat Loss:  Unable to assess     Muscle Mass Loss:  Unable to assess    Fluid Accumulation:  No significant fluid accumulation     Strength:  Not Performed    Nutrition Assessment:    Pt seen for LOS. 52 yo M adm submandibular abscess. PMH significant for COPD, HTN. Pt reports great appetite, endorses eating > 75% of meals. Pt reports drinking Ensure, prefers chocolate flavor. LBM 9/17. No edema noted. Suspect accuracy of measured wt, will monitor. Nutrition Related Findings:    labs/meds reviewed Wound Type: Surgical Incision (to B/L jaw)       Current Nutrition Intake & Therapies:    Average Meal Intake: %  Average Supplements Intake: %  ADULT DIET; Easy to Chew  ADULT ORAL NUTRITION SUPPLEMENT; Lunch, Breakfast, Dinner; Standard High Calorie/High Protein Oral Supplement    Anthropometric Measures:  Height: 6' (182.9 cm)  Ideal Body Weight (IBW): 178 lbs (81 kg)       Current Body Weight: 100 lb 9.6 oz (45.6 kg) (9/18), 56.5 % IBW.     Current BMI (kg/m2): 13.6        Weight Adjustment For: No Adjustment                 BMI Categories: Underweight (BMI less than 18.5)    Estimated Daily Nutrient Needs:  Energy Requirements Based On: Kcal/kg  Weight Used for Energy Requirements: Current  Energy (kcal/day): 1800 to 2000 kcal/day  Weight Used for Protein Requirements: Current  Protein (g/day): 60 to 80 g/day  Method Used for Fluid Requirements: 1 ml/kcal  Fluid (ml/day): 1800 to

## 2023-09-18 NOTE — PLAN OF CARE
Problem: Pain  Goal: Verbalizes/displays adequate comfort level or baseline comfort level  9/18/2023 0110 by Maximino Renteria RN  Outcome: Progressing  9/17/2023 1856 by Yuko Martin RN  Outcome: Progressing   Pt's pain assessed frequently with hourly rounding; assessed all pain characteristics including level, type, location, frequency, and onset. Pt medicated by RN per PRN orders. Non-pharmacologic interventions offered to pt as well. Pt states pain is tolerable at this time. Will continue to monitor.     Electronically signed by Maximino Renteria RN on 9/18/2023 at 1:11 AM

## 2023-09-18 NOTE — PLAN OF CARE
Problem: Respiratory - Adult  Goal: Achieves optimal ventilation and oxygenation  9/17/2023 2059 by Seven Francis RCP  Outcome: Progressing   BRONCHOSPASM/BRONCHOCONSTRICTION     [x]         IMPROVE AERATION/BREATH SOUNDS  [x]   ADMINISTER BRONCHODILATOR THERAPY AS APPROPRIATE  [x]   ASSESS BREATH SOUNDS  [x]   IMPLEMENT AEROSOL/MDI PROTOCOL  [x]   PATIENT EDUCATION AS NEEDED

## 2023-09-18 NOTE — PROGRESS NOTES
is likely but taking him off suction for the chest CT because expansion of pneumothorax. Need follow-up with CT surgery for plan for pneumothorax would be difficult as he has severe       Please note that this chart was generated using voice recognition Dragon dictation software. Although every effort was made to ensure the accuracy of this automated transcription, some errors in transcription may have occurred.       Amisha Hilario MD  9/18/2023 10:26 AM

## 2023-09-18 NOTE — PROGRESS NOTES
CT reviewed. Chest tube parked posteriorly with lung surrounding holes. Pneumothorax is anterior. Recommend anterior chest tube for lung full expasion. Will discuss with attending. FERNANDO Walton    Discussed with radiology.  Will increase suction back to -40

## 2023-09-18 NOTE — PROGRESS NOTES
ENT/OTOLARYNGOLOGY SUBSEQUENT CARE PROGRESS NOTE     REASON FOR CARE: Neck Abscess from Dental Infection     HISTORY OF PRESENT ILLNESS:   Tanesha Machuca is a 51 y.o. who is being seen for follow-up of neck abscess from dental infection, s/p I and D    Subjective:  No acute changes. Pain well controlled. Feels swelling is at its best since admission    Pertinent Examination:   MOUTH/THROAT: mucous membranes moist, no focal lesions, no tonsillar enlargement or exudate; poor dentition  NECK: 1 penrose drains in place, working, soft flaps. No drainage. No palpable swelling. Minimal/ few millimeters induration around tube site only. Left drain removed     RELEVANT LABS/STUDIES:   Additional data reviewed:    Cultures with prevotella species, beta-lactamase positive    CBC:      Latest Reference Range & Units 09/12/23 03:55 09/13/23 05:06 09/14/23 02:24 09/15/23 04:18   WBC 3.5 - 11.3 k/uL 15.4 (H) 12.5 (H) 6.6 6.3   RBC 4.21 - 5.77 m/uL 5.17 4.30 3.81 (L) 4.20 (L)   Hemoglobin Quant 13.0 - 17.0 g/dL 17.3 (H) 14.5 12.7 (L) 13.8   Hematocrit 40.7 - 50.3 % 50.9 (H) 43.9 39.2 (L) 42.6   MCV 82.6 - 102.9 fL 98.5 102.1 102.9 101.4   MCH 25.2 - 33.5 pg 33.5 33.7 (H) 33.3 32.9   MCHC 28.4 - 34.8 g/dL 34.0 33.0 32.4 32.4   MPV 8.1 - 13.5 fL 9.2 9.3 9.0 9.0   RDW 11.8 - 14.4 % 12.3 12.6 12.8 12.6   Platelet Count 213 - 453 k/uL 298 223 194 207   (H): Data is abnormally high  (L): Data is abnormally low    Procedures:    None  Surgical risk factors:  COPD, HTN     IMPRESSION AND RECOMMENDATIONS:   Tanesha Machuca is a 51 y.o. male with odontogenic source of bilateral neck abscesses, L>>R.  S/p incision and drainage on 9/12, clinically improving     Plan:  Continue IV ABX, Vanc/Unasyn is reasonable for dental infection with pneumonia  Cultures with beta-lactamase positive prevotella; caution with narrowing abx coverage too early as oral abscesses are often poly-microbial  Change neck gauze dressing daily and as needed when

## 2023-09-19 ENCOUNTER — APPOINTMENT (OUTPATIENT)
Dept: GENERAL RADIOLOGY | Age: 48
DRG: 364 | End: 2023-09-19
Payer: MEDICAID

## 2023-09-19 LAB
ANION GAP SERPL CALCULATED.3IONS-SCNC: 9 MMOL/L (ref 9–17)
BASOPHILS # BLD: <0.03 K/UL (ref 0–0.2)
BASOPHILS NFR BLD: 0 % (ref 0–2)
BUN SERPL-MCNC: 10 MG/DL (ref 6–20)
CALCIUM SERPL-MCNC: 8.8 MG/DL (ref 8.6–10.4)
CHLORIDE SERPL-SCNC: 100 MMOL/L (ref 98–107)
CO2 SERPL-SCNC: 24 MMOL/L (ref 20–31)
CREAT SERPL-MCNC: 0.4 MG/DL (ref 0.7–1.2)
EOSINOPHIL # BLD: 0.15 K/UL (ref 0–0.44)
EOSINOPHILS RELATIVE PERCENT: 3 % (ref 1–4)
ERYTHROCYTE [DISTWIDTH] IN BLOOD BY AUTOMATED COUNT: 12.6 % (ref 11.8–14.4)
GFR SERPL CREATININE-BSD FRML MDRD: >60 ML/MIN/1.73M2
GLUCOSE SERPL-MCNC: 103 MG/DL (ref 70–99)
HCT VFR BLD AUTO: 44.1 % (ref 40.7–50.3)
HGB BLD-MCNC: 14.4 G/DL (ref 13–17)
IMM GRANULOCYTES # BLD AUTO: 0.04 K/UL (ref 0–0.3)
IMM GRANULOCYTES NFR BLD: 1 %
LYMPHOCYTES NFR BLD: 1.24 K/UL (ref 1.1–3.7)
LYMPHOCYTES RELATIVE PERCENT: 23 % (ref 24–43)
MCH RBC QN AUTO: 32.9 PG (ref 25.2–33.5)
MCHC RBC AUTO-ENTMCNC: 32.7 G/DL (ref 28.4–34.8)
MCV RBC AUTO: 100.7 FL (ref 82.6–102.9)
MONOCYTES NFR BLD: 0.55 K/UL (ref 0.1–1.2)
MONOCYTES NFR BLD: 10 % (ref 3–12)
NEUTROPHILS NFR BLD: 63 % (ref 36–65)
NEUTS SEG NFR BLD: 3.46 K/UL (ref 1.5–8.1)
NRBC BLD-RTO: 0 PER 100 WBC
PLATELET # BLD AUTO: 241 K/UL (ref 138–453)
PMV BLD AUTO: 8.5 FL (ref 8.1–13.5)
POTASSIUM SERPL-SCNC: 4.1 MMOL/L (ref 3.7–5.3)
RBC # BLD AUTO: 4.38 M/UL (ref 4.21–5.77)
SODIUM SERPL-SCNC: 133 MMOL/L (ref 135–144)
WBC OTHER # BLD: 5.5 K/UL (ref 3.5–11.3)

## 2023-09-19 PROCEDURE — 6360000002 HC RX W HCPCS

## 2023-09-19 PROCEDURE — 36415 COLL VENOUS BLD VENIPUNCTURE: CPT

## 2023-09-19 PROCEDURE — 97116 GAIT TRAINING THERAPY: CPT

## 2023-09-19 PROCEDURE — 6370000000 HC RX 637 (ALT 250 FOR IP): Performed by: INTERNAL MEDICINE

## 2023-09-19 PROCEDURE — 99233 SBSQ HOSP IP/OBS HIGH 50: CPT | Performed by: INTERNAL MEDICINE

## 2023-09-19 PROCEDURE — 2060000000 HC ICU INTERMEDIATE R&B

## 2023-09-19 PROCEDURE — 71045 X-RAY EXAM CHEST 1 VIEW: CPT

## 2023-09-19 PROCEDURE — 85025 COMPLETE CBC W/AUTO DIFF WBC: CPT

## 2023-09-19 PROCEDURE — 99232 SBSQ HOSP IP/OBS MODERATE 35: CPT | Performed by: INTERNAL MEDICINE

## 2023-09-19 PROCEDURE — 94640 AIRWAY INHALATION TREATMENT: CPT

## 2023-09-19 PROCEDURE — 97530 THERAPEUTIC ACTIVITIES: CPT

## 2023-09-19 PROCEDURE — 2580000003 HC RX 258

## 2023-09-19 PROCEDURE — 6370000000 HC RX 637 (ALT 250 FOR IP)

## 2023-09-19 PROCEDURE — 94761 N-INVAS EAR/PLS OXIMETRY MLT: CPT

## 2023-09-19 PROCEDURE — 80048 BASIC METABOLIC PNL TOTAL CA: CPT

## 2023-09-19 RX ORDER — AMOXICILLIN AND CLAVULANATE POTASSIUM 875; 125 MG/1; MG/1
1 TABLET, FILM COATED ORAL EVERY 12 HOURS SCHEDULED
Status: DISCONTINUED | OUTPATIENT
Start: 2023-09-20 | End: 2023-09-22 | Stop reason: HOSPADM

## 2023-09-19 RX ADMIN — FOLIC ACID 1 MG: 1 TABLET ORAL at 09:46

## 2023-09-19 RX ADMIN — AMPICILLIN SODIUM AND SULBACTAM SODIUM 3000 MG: 2; 1 INJECTION, POWDER, FOR SOLUTION INTRAMUSCULAR; INTRAVENOUS at 05:36

## 2023-09-19 RX ADMIN — Medication 1 TABLET: at 09:46

## 2023-09-19 RX ADMIN — AMPICILLIN SODIUM AND SULBACTAM SODIUM 3000 MG: 2; 1 INJECTION, POWDER, FOR SOLUTION INTRAMUSCULAR; INTRAVENOUS at 12:24

## 2023-09-19 RX ADMIN — IPRATROPIUM BROMIDE AND ALBUTEROL SULFATE 1 DOSE: 2.5; .5 SOLUTION RESPIRATORY (INHALATION) at 15:16

## 2023-09-19 RX ADMIN — IPRATROPIUM BROMIDE AND ALBUTEROL SULFATE 1 DOSE: 2.5; .5 SOLUTION RESPIRATORY (INHALATION) at 20:03

## 2023-09-19 RX ADMIN — VANCOMYCIN HYDROCHLORIDE 1250 MG: 1.25 INJECTION, POWDER, LYOPHILIZED, FOR SOLUTION INTRAVENOUS at 07:13

## 2023-09-19 RX ADMIN — AMPICILLIN SODIUM AND SULBACTAM SODIUM 3000 MG: 2; 1 INJECTION, POWDER, FOR SOLUTION INTRAMUSCULAR; INTRAVENOUS at 00:02

## 2023-09-19 RX ADMIN — IPRATROPIUM BROMIDE AND ALBUTEROL SULFATE 1 DOSE: 2.5; .5 SOLUTION RESPIRATORY (INHALATION) at 09:21

## 2023-09-19 RX ADMIN — TRAZODONE HYDROCHLORIDE 100 MG: 100 TABLET ORAL at 00:02

## 2023-09-19 RX ADMIN — Medication 100 MG: at 09:46

## 2023-09-19 ASSESSMENT — ENCOUNTER SYMPTOMS
COLOR CHANGE: 0
ABDOMINAL DISTENTION: 0
EYE PAIN: 0
EYE DISCHARGE: 0
APNEA: 0
EYE REDNESS: 0

## 2023-09-19 ASSESSMENT — PAIN SCALES - GENERAL
PAINLEVEL_OUTOF10: 0

## 2023-09-19 NOTE — PROGRESS NOTES
IR consulted for placement of a second chest tube. IR attending, Dr. Mary Yi, discussed case with cardiothoracic surgery, Glo KING. Both services agree to hold off placing an additional chest tube at this time.

## 2023-09-19 NOTE — PROGRESS NOTES
Physical Therapy  Facility/Department: [address] STEPDOWN  Physical Therapy Daily Treatment Note    Name: Ishan Cosby  : 1975  MRN: [id number]  Date of Service: 2023    Discharge Recommendations:  No therapy recommended at discharge   PT Equipment Recommendations  Equipment Needed: No      Patient Diagnosis(es): The primary encounter diagnosis was Primary spontaneous pneumothorax. Diagnoses of Submandibular abscess and Neck abscess were also pertinent to this visit. Past Medical History:  has a past medical history of COPD (chronic obstructive pulmonary disease) ([address]) and Hypertension. Past Surgical History:  has a past surgical history that includes Incision and Drainage of Neck Abscess (2023); Neck surgery (Bilateral, 2023); and IR CHEST TUBE INSERTION (2023). Assessment   Body Structures, Functions, Activity Limitations Requiring Skilled Therapeutic Intervention: Decreased functional mobility ; Decreased strength;Decreased endurance;Decreased balance  Assessment: Pt ambulated 300ft with supervision and no AD, also performed a flight of stairs with SBA provided for safety. Pt overall steady throughout all mobility, has currently met all goals. PT to sign off at this time, please re-order therapy if further needs arise.   Therapy Prognosis: Good  Requires PT Follow-Up: Yes  Activity Tolerance  Activity Tolerance: Patient tolerated treatment well     Plan   Physcial Therapy Plan  General Plan:  (3-5x/week)  Current Treatment Recommendations: Strengthening, Balance training, Functional mobility training, Transfer training, Endurance training, Gait training, Stair training, Neuromuscular re-education, Home exercise program, Safety education & training, Patient/Caregiver education & training, Equipment evaluation, education, & procurement, Therapeutic activities  Safety Devices  Type of Devices: Nurse notified, All fall risk precautions in place, Call light within reach, Gait belt, Left in Fair  Comments: Standing balance assessed w/out AD. AM-PAC Score  AM-PAC Inpatient Mobility Raw Score : 24 (09/19/23 1103)  AM-PAC Inpatient T-Scale Score : 61.14 (09/19/23 1103)  Mobility Inpatient CMS 0-100% Score: 0 (09/19/23 1103)  Mobility Inpatient CMS G-Code Modifier : [id number] [id number] [facility] (09/19/23 1103)      Goals  Short Term Goals  Time Frame for Short Term Goals: 6 visits  Short Term Goal 1: Pt will be IND in all bed mobility tasks  Short Term Goal 2: Pt will be IND in transfers  Short Term Goal 3: Pt will amb 300' IND  Short Term Goal 4: Pt will negotiate 1 flight of steps no rail use. Additional Goals?: No       Education  Patient Education  Education Given To: Patient  Education Provided: Role of Therapy;Plan of Care;Transfer Training;Energy Conservation  Education Provided Comments: importance of mobility.   Education Method: Demonstration;Verbal  Barriers to Learning: None  Education Outcome: Verbalized understanding;Demonstrated understanding      Therapy Time   Individual Concurrent Group Co-treatment   Time In 1040         Time Out 1105         Minutes 25         Timed Code Treatment Minutes: JORI Romero

## 2023-09-19 NOTE — PROGRESS NOTES
ENT/OTOLARYNGOLOGY SUBSEQUENT CARE PROGRESS NOTE     REASON FOR CARE: Neck Abscess from Dental Infection     HISTORY OF PRESENT ILLNESS:   Frantz Caballero is a 51 y.o. who is being seen for follow-up of neck abscess from dental infection, s/p I and D. Both surgical drains were removed yesterday. Patient reports no neck pain or drainage. He has complete movement in neck and denies mouth pain or trouble swallowing. He continues to have a pneumothorax and chest tube in place. Pertinent Examination:   MOUTH/THROAT: mucous membranes moist, no focal lesions, no tonsillar enlargement or exudate; poor dentition  NECK: Neck soft and nontender. There is no active drainage from either neck incision. There is no limitation of movement of the neck. RELEVANT LABS/STUDIES:   Additional data reviewed:    Cultures with prevotella species, beta-lactamase positive    Procedures:    None    Surgical risk factors:  COPD, HTN     IMPRESSION AND RECOMMENDATIONS:   Frantz Caballero is a 51 y.o. male with odontogenic source of bilateral neck abscesses, resolved after incision and drainage. Plan:  - Continue IV ABX, Vanc/Unasyn is reasonable for dental infection with pneumonia  Cultures with beta-lactamase positive prevotella; caution with narrowing abx coverage too early as oral abscesses are often poly-microbial  - Okay for discharge from ENT standpoint when clinically appropriate (patient still with chest tube). - Patient should follow up as soon as possible with dental/oral surgery follow-up after discharge home  - ENT will follow peripherally. Please call with questions/ change in patient condition.      Raulito Gonzáles MD  Pediatric Otolaryngology-Head and Neck Surgery  [address] [place] [address] Otolaryngology group     Office  ph# [phone]    Also available in [facility]

## 2023-09-19 NOTE — PLAN OF CARE
Problem: Discharge Planning  Goal: Discharge to home or other facility with appropriate resources  Outcome: Progressing  Flowsheets (Taken 9/19/2023 0800)  Discharge to home or other facility with appropriate resources:   Identify barriers to discharge with patient and caregiver   Identify discharge learning needs (meds, wound care, etc)   Arrange for interpreters to assist at discharge as needed   Arrange for needed discharge resources and transportation as appropriate   Refer to discharge planning if patient needs post-hospital services based on physician order or complex needs related to functional status, cognitive ability or social support system     Problem: Pain  Goal: Verbalizes/displays adequate comfort level or baseline comfort level  Outcome: Progressing     Problem: Skin/Tissue Integrity  Goal: Absence of new skin breakdown  Description: 1. Monitor for areas of redness and/or skin breakdown  2. Assess vascular access sites hourly  3. Every 4-6 hours minimum:  Change oxygen saturation probe site  4. Every 4-6 hours:  If on nasal continuous positive airway pressure, respiratory therapy assess nares and determine need for appliance change or resting period.   Outcome: Progressing     Problem: Safety - Adult  Goal: Free from fall injury  Outcome: Progressing     Problem: ABCDS Injury Assessment  Goal: Absence of physical injury  Outcome: Progressing     Problem: Respiratory - Adult  Goal: Achieves optimal ventilation and oxygenation  9/19/2023 1858 by Chetna Hagen RN  Outcome: Progressing  9/19/2023 0924 by Janith Kawasaki, RCP  Flowsheets  Taken 9/19/2023 0924 by Janith Kawasaki, RCP  Achieves optimal ventilation and oxygenation: Respiratory therapy support as indicated  Taken 9/19/2023 0800 by Chetna Hagen RN  Achieves optimal ventilation and oxygenation:   Assess for changes in respiratory status   Assess for changes in mentation and behavior   Position to facilitate oxygenation and minimize

## 2023-09-19 NOTE — PROGRESS NOTES
[facility]  Internal Medicine Teaching Residency Program  Inpatient Daily Progress Note  ______________________________________________________________________________    Patient: Soy Mcfarland  YOB: 1975   MCK:[id number]    Acct: [de-identified]     Room: [facility]  Admit date: 9/12/2023  Today's date: 09/19/23  Number of days in the hospital: 7    SUBJECTIVE   Admitting Diagnosis: Submandibular abscess  CC: confusion  Pt examined at bedside. Chart & results reviewed. No acute overnight events. Afebrile. Intermittently tachycardic, HR up to 107. Saturating appropriately on RA. He reports no compliants. Denies SOB despite increasing size of right-sided PTX on imaging. Decreasing right CT output 30cc, however there is still an air leak. ROS:  Constitutional:  negative for chills, fevers, sweats  Respiratory:  negative for cough, dyspnea on exertion, hemoptysis, shortness of breath, wheezing  Cardiovascular:  negative for chest pain, chest pressure/discomfort, lower extremity edema, palpitations  Gastrointestinal:  negative for abdominal pain, constipation, diarrhea, nausea, vomiting  Neurological:  negative for dizziness, headache  BRIEF HISTORY     The patient is a 51 y.o. male with past medical history significant for smoking, COPD, and alcohol use who was initially admitted on 9/12/2023 with Neck abscess [L02.11]  Submandibular abscess [K12.2]  Primary spontaneous pneumothorax [J93.11] and presented to [facility]. [facility] on 9/12 with abrupt onset SOB. Tachycardic, -130. CT chest showed a 3.46 cm right middle lobe PTX with mediastinal shift. Right-sided chest tube placed. Patient placed on a nonbreather mask. Patient also reported a large mass on her chin that had been expanding in size for the past 3-4 days, which she worried may have been cancer due to her father having a similar presentation at the time of his neck cancer diagnosis.  CT showed Submandibular abscess   -CT showed large BL submandibular abscesses, 4cm on the left and 4.2cm on the right.   -most likely secondary to dental source. Patient has poor oral hygiene and chronic alcohol use   -ENT on board.   -9/12 bilateral I&D of submandibular abscesses via ENT  -9/16 Right drain removed   -9/18 left drain removed  -Neck guaze dressing change PRN when saturated  -Easy to chew diet  -9/12 Cx growing Prevotella oris and beta-lactamase positive microorganisms  -Last day on vancomycin and Unasyn   -Switch to augmentin tomorrow x 7days  -Outpatient follow-up for need for dental vs. Oral surgery upon discharge   -ID consulted     Spontaneous pneumothorax  -most likely 2/2 to bullous emphysema and COPD  -9/12 CT chest: multiple medium sized pulmonary bullae in right pulmonary upper. Bullous emphysematous changes in the apex of left lung  Lobe. Right apical PTX   -9/12 R chest tube placed  -Monitor output 20 < 287 < 100  -9/14 CXR: worsening right-sided PTX and increasing subcutaneous emphysema on the right compared to 9/13  -9/14 CXR: worsening right-sided PTX and increasing subcutaneous emphysema on the right compared to 9/13  -9/14: chest tube upsized by IR via pulmonology and CTS recs due to enlarging PTX and CT leakage  -9/15 CXR: decreasing right-sided PTX, no change in right-sided   subcutaneous emphysema   -9/17 CXR: slight increase in right-sided PTX  -Pulmonology consulted. -CTS consulted.    -poor surgical candidate    -Chest tube place at -40 suction; monitor to see if air leak improves   -If no improvement in PTX, possibility of bedside talc pleurodesis per CTS   -9/18 CT chest: increased size of right apical PTX into right middle lobe compared to 9/12 CT. -Aggressive IS and Acapella 10x per hour while awake  -Daily CXR      Aspiration PNA   -Imaging shows right middle lobe PNA   -Pulmonology consulted  -Last day on vancomycin and unasyn.       Alcohol dependence              -hx of

## 2023-09-19 NOTE — PLAN OF CARE
Problem: Respiratory - Adult  Goal: Achieves optimal ventilation and oxygenation  Flowsheets (Taken 9/19/2023 0924)  Achieves optimal ventilation and oxygenation: Respiratory therapy support as indicated

## 2023-09-19 NOTE — PROGRESS NOTES
Increased pneumo with increase in subq air. + air leak. Have IR place guided anterior placed pigtail. If lung expands will try talc slurry.     Linda Taylor, PA

## 2023-09-20 ENCOUNTER — APPOINTMENT (OUTPATIENT)
Dept: GENERAL RADIOLOGY | Age: 48
DRG: 364 | End: 2023-09-20
Payer: MEDICAID

## 2023-09-20 LAB
ANION GAP SERPL CALCULATED.3IONS-SCNC: 10 MMOL/L (ref 9–17)
BASOPHILS # BLD: <0.03 K/UL (ref 0–0.2)
BASOPHILS NFR BLD: 0 % (ref 0–2)
BUN SERPL-MCNC: 13 MG/DL (ref 6–20)
CALCIUM SERPL-MCNC: 8.8 MG/DL (ref 8.6–10.4)
CHLORIDE SERPL-SCNC: 101 MMOL/L (ref 98–107)
CO2 SERPL-SCNC: 21 MMOL/L (ref 20–31)
CREAT SERPL-MCNC: 0.5 MG/DL (ref 0.7–1.2)
EOSINOPHIL # BLD: 0.13 K/UL (ref 0–0.44)
EOSINOPHILS RELATIVE PERCENT: 3 % (ref 1–4)
ERYTHROCYTE [DISTWIDTH] IN BLOOD BY AUTOMATED COUNT: 12.7 % (ref 11.8–14.4)
GFR SERPL CREATININE-BSD FRML MDRD: >60 ML/MIN/1.73M2
GLUCOSE SERPL-MCNC: 99 MG/DL (ref 70–99)
HCT VFR BLD AUTO: 44.1 % (ref 40.7–50.3)
HGB BLD-MCNC: 14.2 G/DL (ref 13–17)
IMM GRANULOCYTES # BLD AUTO: <0.03 K/UL (ref 0–0.3)
IMM GRANULOCYTES NFR BLD: 0 %
LYMPHOCYTES NFR BLD: 1.24 K/UL (ref 1.1–3.7)
LYMPHOCYTES RELATIVE PERCENT: 24 % (ref 24–43)
MCH RBC QN AUTO: 32.8 PG (ref 25.2–33.5)
MCHC RBC AUTO-ENTMCNC: 32.2 G/DL (ref 28.4–34.8)
MCV RBC AUTO: 101.8 FL (ref 82.6–102.9)
MONOCYTES NFR BLD: 0.52 K/UL (ref 0.1–1.2)
MONOCYTES NFR BLD: 10 % (ref 3–12)
NEUTROPHILS NFR BLD: 63 % (ref 36–65)
NEUTS SEG NFR BLD: 3.3 K/UL (ref 1.5–8.1)
NRBC BLD-RTO: 0 PER 100 WBC
PLATELET # BLD AUTO: 240 K/UL (ref 138–453)
PMV BLD AUTO: 9 FL (ref 8.1–13.5)
POTASSIUM SERPL-SCNC: 4.2 MMOL/L (ref 3.7–5.3)
RBC # BLD AUTO: 4.33 M/UL (ref 4.21–5.77)
SODIUM SERPL-SCNC: 132 MMOL/L (ref 135–144)
WBC OTHER # BLD: 5.2 K/UL (ref 3.5–11.3)

## 2023-09-20 PROCEDURE — 36415 COLL VENOUS BLD VENIPUNCTURE: CPT

## 2023-09-20 PROCEDURE — 6360000002 HC RX W HCPCS

## 2023-09-20 PROCEDURE — 6370000000 HC RX 637 (ALT 250 FOR IP)

## 2023-09-20 PROCEDURE — 94761 N-INVAS EAR/PLS OXIMETRY MLT: CPT

## 2023-09-20 PROCEDURE — 2580000003 HC RX 258: Performed by: STUDENT IN AN ORGANIZED HEALTH CARE EDUCATION/TRAINING PROGRAM

## 2023-09-20 PROCEDURE — 6370000000 HC RX 637 (ALT 250 FOR IP): Performed by: INTERNAL MEDICINE

## 2023-09-20 PROCEDURE — 71045 X-RAY EXAM CHEST 1 VIEW: CPT

## 2023-09-20 PROCEDURE — 2580000003 HC RX 258

## 2023-09-20 PROCEDURE — 99233 SBSQ HOSP IP/OBS HIGH 50: CPT | Performed by: INTERNAL MEDICINE

## 2023-09-20 PROCEDURE — 80048 BASIC METABOLIC PNL TOTAL CA: CPT

## 2023-09-20 PROCEDURE — 85025 COMPLETE CBC W/AUTO DIFF WBC: CPT

## 2023-09-20 PROCEDURE — 97535 SELF CARE MNGMENT TRAINING: CPT

## 2023-09-20 PROCEDURE — 2060000000 HC ICU INTERMEDIATE R&B

## 2023-09-20 PROCEDURE — 94640 AIRWAY INHALATION TREATMENT: CPT

## 2023-09-20 PROCEDURE — 99232 SBSQ HOSP IP/OBS MODERATE 35: CPT | Performed by: INTERNAL MEDICINE

## 2023-09-20 RX ADMIN — TRAZODONE HYDROCHLORIDE 100 MG: 100 TABLET ORAL at 22:40

## 2023-09-20 RX ADMIN — SODIUM CHLORIDE, PRESERVATIVE FREE 10 ML: 5 INJECTION INTRAVENOUS at 07:35

## 2023-09-20 RX ADMIN — ENOXAPARIN SODIUM 30 MG: 100 INJECTION SUBCUTANEOUS at 07:34

## 2023-09-20 RX ADMIN — AMOXICILLIN AND CLAVULANATE POTASSIUM 1 TABLET: 875; 125 TABLET, FILM COATED ORAL at 07:34

## 2023-09-20 RX ADMIN — SODIUM CHLORIDE, PRESERVATIVE FREE 10 ML: 5 INJECTION INTRAVENOUS at 21:55

## 2023-09-20 RX ADMIN — SODIUM CHLORIDE, PRESERVATIVE FREE 10 ML: 5 INJECTION INTRAVENOUS at 21:57

## 2023-09-20 RX ADMIN — TRAZODONE HYDROCHLORIDE 100 MG: 100 TABLET ORAL at 00:20

## 2023-09-20 RX ADMIN — IPRATROPIUM BROMIDE AND ALBUTEROL SULFATE 1 DOSE: 2.5; .5 SOLUTION RESPIRATORY (INHALATION) at 20:20

## 2023-09-20 RX ADMIN — AMOXICILLIN AND CLAVULANATE POTASSIUM 1 TABLET: 875; 125 TABLET, FILM COATED ORAL at 21:55

## 2023-09-20 RX ADMIN — SODIUM CHLORIDE, PRESERVATIVE FREE 10 ML: 5 INJECTION INTRAVENOUS at 07:36

## 2023-09-20 RX ADMIN — Medication 100 MG: at 07:34

## 2023-09-20 RX ADMIN — METOPROLOL TARTRATE 12.5 MG: 25 TABLET ORAL at 21:56

## 2023-09-20 RX ADMIN — FOLIC ACID 1 MG: 1 TABLET ORAL at 07:34

## 2023-09-20 RX ADMIN — IPRATROPIUM BROMIDE AND ALBUTEROL SULFATE 1 DOSE: 2.5; .5 SOLUTION RESPIRATORY (INHALATION) at 13:52

## 2023-09-20 RX ADMIN — IPRATROPIUM BROMIDE AND ALBUTEROL SULFATE 1 DOSE: 2.5; .5 SOLUTION RESPIRATORY (INHALATION) at 08:47

## 2023-09-20 RX ADMIN — METOPROLOL TARTRATE 12.5 MG: 25 TABLET ORAL at 11:00

## 2023-09-20 RX ADMIN — Medication 1 TABLET: at 07:34

## 2023-09-20 ASSESSMENT — ENCOUNTER SYMPTOMS
EYE REDNESS: 0
ABDOMINAL DISTENTION: 0
APNEA: 0
EYE PAIN: 0
COLOR CHANGE: 0
EYE DISCHARGE: 0

## 2023-09-20 NOTE — PLAN OF CARE
Problem: Discharge Planning  Goal: Discharge to home or other facility with appropriate resources  Outcome: Progressing     Problem: Pain  Goal: Verbalizes/displays adequate comfort level or baseline comfort level  Outcome: Progressing     Problem: Skin/Tissue Integrity  Goal: Absence of new skin breakdown  Description: 1. Monitor for areas of redness and/or skin breakdown  2. Assess vascular access sites hourly  3. Every 4-6 hours minimum:  Change oxygen saturation probe site  4. Every 4-6 hours:  If on nasal continuous positive airway pressure, respiratory therapy assess nares and determine need for appliance change or resting period. Outcome: Progressing     Problem: Safety - Adult  Goal: Free from fall injury  Outcome: Progressing     Problem: ABCDS Injury Assessment  Goal: Absence of physical injury  Outcome: Progressing     Problem: Respiratory - Adult  Goal: Achieves optimal ventilation and oxygenation  9/20/2023 1736 by Momo Zimmer RN  Outcome: Progressing  9/20/2023 0937 by Bev Evans RCP  Outcome: Progressing     Problem: Nutrition Deficit:  Goal: Optimize nutritional status  Outcome: Progressing     Problem: Discharge Planning  Goal: Discharge to home or other facility with appropriate resources  Outcome: Progressing     Problem: Pain  Goal: Verbalizes/displays adequate comfort level or baseline comfort level  Outcome: Progressing     Problem: Skin/Tissue Integrity  Goal: Absence of new skin breakdown  Description: 1. Monitor for areas of redness and/or skin breakdown  2. Assess vascular access sites hourly  3. Every 4-6 hours minimum:  Change oxygen saturation probe site  4. Every 4-6 hours:  If on nasal continuous positive airway pressure, respiratory therapy assess nares and determine need for appliance change or resting period.   Outcome: Progressing     Problem: Safety - Adult  Goal: Free from fall injury  Outcome: Progressing     Problem: ABCDS Injury Assessment  Goal:

## 2023-09-20 NOTE — PROGRESS NOTES
Occupational Therapy  [facility] [address] [facility]  [facility] Therapy Daily Treatment Note      Name: Marcy Mccracken  : 1975  MRN: [id number]  Date of Service: 2023    Discharge Recommendations:  Patient cleared from OT services          Patient Diagnosis(es): The primary encounter diagnosis was Primary spontaneous pneumothorax. Diagnoses of Submandibular abscess and Neck abscess were also pertinent to this visit. Past Medical History:  has a past medical history of COPD (chronic obstructive pulmonary disease) ([address]) and Hypertension. Past Surgical History:  has a past surgical history that includes Incision and Drainage of Neck Abscess (2023); Neck surgery (Bilateral, 2023); and IR CHEST TUBE INSERTION (2023). Assessment   Assessment: Pt completed functional transfers/mobility, dressing and toileting transfers independently w/o DME. Pt verbalized/demo'd energy conservation techniques for ADL/IADL tasks. Prognosis: Good  No Skilled OT: At baseline function  Activity Tolerance  Activity Tolerance: Patient Tolerated treatment well        Plan   Occupational Therapy Plan  Times Per Week: 3-4x/week  Current Treatment Recommendations: Balance training, Functional mobility training, Endurance training, Safety education & training, Pain management, Patient/Caregiver education & training, Equipment evaluation, education, & procurement, Home management training, Self-Care / ADL     Restrictions  Restrictions/Precautions  Restrictions/Precautions: Fall Risk, Up as Tolerated, Seizure, General Precautions  Required Braces or Orthoses?: No  Position Activity Restriction  Other position/activity restrictions: S/p I and D L Neck Abscess (), chest tube to continuous suction.     Subjective   General  Patient assessed for rehabilitation services?: Yes  Response to previous treatment: Patient with no complaints from previous session  Family / Caregiver Present: No  General Comment  Comments: RN ok'd pt for OT this pm. Pt agreeable, cooperative and pleasant throughout session. Pt did not report pain at this time. Objective   Safety Devices  Type of Devices: Nurse notified;Call light within reach;Gait belt;Left in bed; All fall risk precautions in place  Restraints  Restraints Initially in Place: No  Balance  Sitting: Intact (Pt sat EOB w/ good BLE progression and on toilet I w/ good trunk control and sitting balance. Pt completed dynamic sitting activity and sat ~7 mins total.)  Standing: Intact (Pt stood I w/o DME from EOB and from toilet. Pt stood ~3-5 mins total.)  Transfer Training  Transfer Training: Yes  Overall Level of Assistance: Independent  Interventions: Verbal cues (One verbal cue used for safety awareness of drain tubing location when performing functional mobility w/ good return.)  Sit to Stand: Independent  Stand to Sit: Independent  Toilet Transfer: Independent  Gait  Overall Level of Assistance: Independent        ADL  UE Dressing: Independent  UE Dressing Skilled Clinical Factors: Pt doffed/donned new gown I while seated EOB to simulate t-shirt. LE Dressing: Independent  LE Dressing Skilled Clinical Factors: Pt doffed/donned pants while standing I. Pt able to move pants over hips w/o loss of balance. Pt also doffed/donned socks while seated EOB I and was able to perform functional reach. Toileting: Independent  Toileting Skilled Clinical Factors: Pt completed toilet transfer I. Pt able to perform functional reach for phillip care  Additional Comments: Pt demo'd functional reach for UB/LB bathing/dressing and toileting at this time. Pt also demo'd and verbalized EC techniques to use when performing ADL/IADL tasks. Bed mobility  Supine to Sit: Independent  Sit to Supine: Independent  Scooting: Independent  Bed Mobility Comments: Pt independent with bed mobility, HOB elevated.         Cognition  Overall Cognitive Status: [facility]  Orientation  Overall Orientation

## 2023-09-20 NOTE — PROGRESS NOTES
[facility]  Internal Medicine Teaching Residency Program  Inpatient Daily Progress Note  ______________________________________________________________________________    Patient: Garth Kelly  YOB: 1975   XCI:[id number]    Acct: [de-identified]     Room: [facility]-0  Admit date: 9/12/2023  Today's date: 09/20/23  Number of days in the hospital: 8    SUBJECTIVE   Admitting Diagnosis: Submandibular abscess  CC: confusion  Pt examined at bedside. Chart & results reviewed. No acute overnight events. Afebrile. Intermittently tachycardic, HR up to 112, started on lopressor. Saturating appropriately on RA. CT placed to -20 suction with minimal output, 75cc? ? He reports no compliants and denies any SOB. Switched to Augmentin yesterday for treatment of BL submandibular abscesses. ROS:  Constitutional:  negative for chills, fevers, sweats  Respiratory:  negative for cough, dyspnea on exertion, hemoptysis, shortness of breath, wheezing  Cardiovascular:  negative for chest pain, chest pressure/discomfort, lower extremity edema, palpitations  Gastrointestinal:  negative for abdominal pain, constipation, diarrhea, nausea, vomiting  Neurological:  negative for dizziness, headache  BRIEF HISTORY     The patient is a 51 y.o. male with past medical history significant for smoking, COPD, and alcohol use who was initially admitted on 9/12/2023 with Neck abscess [L02.11]  Submandibular abscess [K12.2]  Primary spontaneous pneumothorax [J93.11] and presented to [facility]. [facility] on 9/12 with abrupt onset SOB. Tachycardic, -130. CT chest showed a 3.46 cm right middle lobe PTX with mediastinal shift. Right-sided chest tube placed. Patient placed on a nonbreather mask.      Patient also reported a large mass on her chin that had been expanding in size for the past 3-4 days, which she worried may have been cancer due to her father having a similar presentation at the time of his collapsed.   -Aggressive IS and Acapella 10x per hour while awake  -Daily CXR      Aspiration PNA   -Imaging shows right middle lobe PNA   -Pulmonology consulted  -Last day on vancomycin and unasyn. Alcohol dependence              -hx of alcohol use, drinks 4 beers/day   -Last required ativan on 4/14   -9/17/2022 CIWA protocol discontinued              -thamine   -folic acid   -multivitamin     COPD  -9/12 CT: multiple bullous emphysema noted in the right lung apex  -Duoneb q4h while awake  -F/U alpha-1 antitrypsin level    6. Smoker    -educated on smoking cessation     7. Malnutrition    -BMI 15  -On high calorie and protein supplement  -Dietary consulted      DVT ppx : lovenox  GI ppx: Not indicated    PT/OT: On board  Discharge Planning / SW: To be decided    Philip Hernández MD  Internal Medicine Resident, PGY-2  [address];  [place], [place]  9/20/2023, 7:27 AM

## 2023-09-20 NOTE — PLAN OF CARE
Problem: Respiratory - Adult  Goal: Achieves optimal ventilation and oxygenation  9/20/2023 0937 by Stefany Alvarez RCP  Outcome: Progressing   BRONCHOSPASM/BRONCHOCONSTRICTION     [x]         IMPROVE AERATION/BREATH SOUNDS  [x]   ADMINISTER BRONCHODILATOR THERAPY AS APPROPRIATE  [x]   ASSESS BREATH SOUNDS  []   IMPLEMENT AEROSOL/MDI PROTOCOL  [x]   PATIENT EDUCATION AS NEEDED   MOBILIZE SECRETIONS    [x]   ASSESS BREATH SOUNDS  [x]   ASSESS SPUTUM PRODUCTION  [x]   COUGH AND DEEP BREATHING  []  IMPLEMENT SECRETION MANAGEMENT PROTOCOL  [x]   PATIENT EDUCATION AS NEEDED

## 2023-09-20 NOTE — PLAN OF CARE
Problem: Respiratory - Adult  Goal: Achieves optimal ventilation and oxygenation  9/19/2023 2011 by Cammie Stewart RCP  Outcome: Progressing   BRONCHOSPASM/BRONCHOCONSTRICTION     [x]         IMPROVE AERATION/BREATH SOUNDS  [x]   ADMINISTER BRONCHODILATOR THERAPY AS APPROPRIATE  [x]   ASSESS BREATH SOUNDS  []   IMPLEMENT AEROSOL/MDI PROTOCOL  [x]   PATIENT EDUCATION AS NEEDED   PROVIDE ADEQUATE OXYGENATION WITH ACCEPTABLE SP02/ABG'S    [x]  IDENTIFY APPROPRIATE OXYGEN THERAPY  [x]   MONITOR SP02/ABG'S AS NEEDED   [x]   PATIENT EDUCATION AS NEEDED

## 2023-09-20 NOTE — PROGRESS NOTES
On the CT scan it showed to be down anteriorly. Dr. Mirza Mcmullen felt that this was because the patient was off suction. Patient returned to the room and placed back to -40 suction. Stable reduced to -20 suction. Repeat CT scan tomorrow on portable suction. CT scan shows lung to be expanded while on port portable suction we will try talc slurry tomorrow morning. If able to do talc slurry will leave on suction for 48 to 72 hours afterwards and possibly DC on Monday. This was discussed with patient and his sister.     FERNANDO Atkins

## 2023-09-21 ENCOUNTER — APPOINTMENT (OUTPATIENT)
Dept: GENERAL RADIOLOGY | Age: 48
DRG: 364 | End: 2023-09-21
Payer: MEDICAID

## 2023-09-21 PROCEDURE — 6370000000 HC RX 637 (ALT 250 FOR IP)

## 2023-09-21 PROCEDURE — 2580000003 HC RX 258: Performed by: STUDENT IN AN ORGANIZED HEALTH CARE EDUCATION/TRAINING PROGRAM

## 2023-09-21 PROCEDURE — 71045 X-RAY EXAM CHEST 1 VIEW: CPT

## 2023-09-21 PROCEDURE — 94640 AIRWAY INHALATION TREATMENT: CPT

## 2023-09-21 PROCEDURE — 99232 SBSQ HOSP IP/OBS MODERATE 35: CPT | Performed by: INTERNAL MEDICINE

## 2023-09-21 PROCEDURE — 2060000000 HC ICU INTERMEDIATE R&B

## 2023-09-21 PROCEDURE — 6360000002 HC RX W HCPCS

## 2023-09-21 PROCEDURE — 94761 N-INVAS EAR/PLS OXIMETRY MLT: CPT

## 2023-09-21 PROCEDURE — 6370000000 HC RX 637 (ALT 250 FOR IP): Performed by: INTERNAL MEDICINE

## 2023-09-21 PROCEDURE — 99233 SBSQ HOSP IP/OBS HIGH 50: CPT | Performed by: INTERNAL MEDICINE

## 2023-09-21 PROCEDURE — 2580000003 HC RX 258

## 2023-09-21 RX ADMIN — TRAZODONE HYDROCHLORIDE 100 MG: 100 TABLET ORAL at 22:00

## 2023-09-21 RX ADMIN — SODIUM CHLORIDE, PRESERVATIVE FREE 10 ML: 5 INJECTION INTRAVENOUS at 20:21

## 2023-09-21 RX ADMIN — IPRATROPIUM BROMIDE AND ALBUTEROL SULFATE 1 DOSE: 2.5; .5 SOLUTION RESPIRATORY (INHALATION) at 15:17

## 2023-09-21 RX ADMIN — AMOXICILLIN AND CLAVULANATE POTASSIUM 1 TABLET: 875; 125 TABLET, FILM COATED ORAL at 20:21

## 2023-09-21 RX ADMIN — FOLIC ACID 1 MG: 1 TABLET ORAL at 08:10

## 2023-09-21 RX ADMIN — METOPROLOL TARTRATE 12.5 MG: 25 TABLET ORAL at 08:10

## 2023-09-21 RX ADMIN — SODIUM CHLORIDE, PRESERVATIVE FREE 10 ML: 5 INJECTION INTRAVENOUS at 08:10

## 2023-09-21 RX ADMIN — IPRATROPIUM BROMIDE AND ALBUTEROL SULFATE 1 DOSE: 2.5; .5 SOLUTION RESPIRATORY (INHALATION) at 08:25

## 2023-09-21 RX ADMIN — Medication 1 TABLET: at 08:10

## 2023-09-21 RX ADMIN — SODIUM CHLORIDE, PRESERVATIVE FREE 10 ML: 5 INJECTION INTRAVENOUS at 20:24

## 2023-09-21 RX ADMIN — IPRATROPIUM BROMIDE AND ALBUTEROL SULFATE 1 DOSE: 2.5; .5 SOLUTION RESPIRATORY (INHALATION) at 19:37

## 2023-09-21 RX ADMIN — AMOXICILLIN AND CLAVULANATE POTASSIUM 1 TABLET: 875; 125 TABLET, FILM COATED ORAL at 08:10

## 2023-09-21 RX ADMIN — Medication 100 MG: at 08:10

## 2023-09-21 RX ADMIN — ENOXAPARIN SODIUM 30 MG: 100 INJECTION SUBCUTANEOUS at 08:11

## 2023-09-21 RX ADMIN — METOPROLOL TARTRATE 12.5 MG: 25 TABLET ORAL at 20:21

## 2023-09-21 ASSESSMENT — ENCOUNTER SYMPTOMS
EYE REDNESS: 0
COLOR CHANGE: 0
EYE DISCHARGE: 0
APNEA: 0
EYE PAIN: 0
ABDOMINAL DISTENTION: 0

## 2023-09-21 NOTE — PROGRESS NOTES
[facility]  Internal Medicine Teaching Residency Program  Inpatient Daily Progress Note  ______________________________________________________________________________    Patient: Camilo Barnett  YOB: 1975   [facility]:[id number]    Acct: [de-identified]     Room: [facility]  Admit date: 9/12/2023  Today's date: 09/21/23  Number of days in the hospital: 9    SUBJECTIVE   Admitting Diagnosis: Submandibular abscess  CC: confusion  Pt examined at bedside. Chart & results reviewed. No acute overnight events. Afebrile. Vital signs stable. On RA, SpO2 96. Right CT output 50cc, on -20 suction. Repeat CT today with tentative plan for bedside talc pleurodesis later today. ROS:  Constitutional:  negative for chills, fevers, sweats  Respiratory:  negative for cough, dyspnea on exertion, hemoptysis, shortness of breath, wheezing  Cardiovascular:  negative for chest pain, chest pressure/discomfort, lower extremity edema, palpitations  Gastrointestinal:  negative for abdominal pain, constipation, diarrhea, nausea, vomiting  Neurological:  negative for dizziness, headache  BRIEF HISTORY     The patient is a 51 y.o. male with past medical history significant for smoking, COPD, and alcohol use who was initially admitted on 9/12/2023 with Neck abscess [L02.11]  Submandibular abscess [K12.2]  Primary spontaneous pneumothorax [J93.11] and presented to [facility]. [facility] on 9/12 with abrupt onset SOB. Tachycardic, -130. CT chest showed a 3.46 cm right middle lobe PTX with mediastinal shift. Right-sided chest tube placed. Patient placed on a nonbreather mask. Patient also reported a large mass on her chin that had been expanding in size for the past 3-4 days, which she worried may have been cancer due to her father having a similar presentation at the time of his neck cancer diagnosis. CT showed large BL submandibular abscesses, 4cm on the left and 4.2cm on the right. WBC 15.4.

## 2023-09-21 NOTE — PLAN OF CARE
Problem: Respiratory - Adult  Goal: Achieves optimal ventilation and oxygenation  9/20/2023 2041 by Norm Cooper RCP  Outcome: Progressing   BRONCHOSPASM/BRONCHOCONSTRICTION     [x]         IMPROVE AERATION/BREATH SOUNDS  [x]   ADMINISTER BRONCHODILATOR THERAPY AS APPROPRIATE  [x]   ASSESS BREATH SOUNDS  []   IMPLEMENT AEROSOL/MDI PROTOCOL  [x]   PATIENT EDUCATION AS NEEDED   PROVIDE ADEQUATE OXYGENATION WITH ACCEPTABLE SP02/ABG'S    [x]  IDENTIFY APPROPRIATE OXYGEN THERAPY  [x]   MONITOR SP02/ABG'S AS NEEDED   [x]   PATIENT EDUCATION AS NEEDED

## 2023-09-21 NOTE — PLAN OF CARE
Problem: Respiratory - Adult  Goal: Achieves optimal ventilation and oxygenation  9/21/2023 0832 by Dar Cárdenas RCP  Outcome: Progressing   BRONCHOSPASM/BRONCHOCONSTRICTION     [x]         IMPROVE AERATION/BREATH SOUNDS  [x]   ADMINISTER BRONCHODILATOR THERAPY AS APPROPRIATE  [x]   ASSESS BREATH SOUNDS  []   IMPLEMENT AEROSOL/MDI PROTOCOL  [x]   PATIENT EDUCATION AS NEEDED   MOBILIZE SECRETIONS    [x]   ASSESS BREATH SOUNDS  [x]   ASSESS SPUTUM PRODUCTION  [x]   COUGH AND DEEP BREATHING  []  IMPLEMENT SECRETION MANAGEMENT PROTOCOL  [x]   PATIENT EDUCATION AS NEEDED

## 2023-09-21 NOTE — PLAN OF CARE
Problem: Respiratory - Adult  Goal: Achieves optimal ventilation and oxygenation  9/21/2023 1939 by Vicente Bateman RCP  Outcome: Progressing   BRONCHOSPASM/BRONCHOCONSTRICTION     [x]         IMPROVE AERATION/BREATH SOUNDS  [x]   ADMINISTER BRONCHODILATOR THERAPY AS APPROPRIATE  [x]   ASSESS BREATH SOUNDS  []   IMPLEMENT AEROSOL/MDI PROTOCOL  [x]   PATIENT EDUCATION AS NEEDED   PROVIDE ADEQUATE OXYGENATION WITH ACCEPTABLE SP02/ABG'S    [x]  IDENTIFY APPROPRIATE OXYGEN THERAPY  [x]   MONITOR SP02/ABG'S AS NEEDED   [x]   PATIENT EDUCATION AS NEEDED

## 2023-09-21 NOTE — PLAN OF CARE
Problem: Discharge Planning  Goal: Discharge to home or other facility with appropriate resources  9/21/2023 1947 by Aleisha Martino RN  Outcome: Progressing     Problem: Pain  Goal: Verbalizes/displays adequate comfort level or baseline comfort level  9/21/2023 1947 by Aleisha Martino RN  Outcome: Progressing     Problem: Skin/Tissue Integrity  Goal: Absence of new skin breakdown  Description: 1. Monitor for areas of redness and/or skin breakdown  2. Assess vascular access sites hourly  3. Every 4-6 hours minimum:  Change oxygen saturation probe site  4. Every 4-6 hours:  If on nasal continuous positive airway pressure, respiratory therapy assess nares and determine need for appliance change or resting period.   9/21/2023 1947 by Aleisha Martino RN  Outcome: Progressing     Problem: Safety - Adult  Goal: Free from fall injury  9/21/2023 1947 by Aleisha Martino RN  Outcome: Progressing     Problem: ABCDS Injury Assessment  Goal: Absence of physical injury  9/21/2023 1947 by Aleisha Martino RN  Outcome: Progressing     Problem: ABCDS Injury Assessment  Goal: Absence of physical injury  9/21/2023 1947 by Aleisha Martino RN  Outcome: Progressing     Problem: Respiratory - Adult  Goal: Achieves optimal ventilation and oxygenation  9/21/2023 1947 by Aleisha Martino RN  Outcome: Progressing     Problem: Nutrition Deficit:  Goal: Optimize nutritional status  9/21/2023 1947 by Aleisha Martino RN  Outcome: Progressing

## 2023-09-21 NOTE — PLAN OF CARE
Problem: Discharge Planning  Goal: Discharge to home or other facility with appropriate resources  Outcome: Progressing     Problem: Pain  Goal: Verbalizes/displays adequate comfort level or baseline comfort level  Outcome: Progressing     Problem: Skin/Tissue Integrity  Goal: Absence of new skin breakdown  Description: 1. Monitor for areas of redness and/or skin breakdown  2. Assess vascular access sites hourly  3. Every 4-6 hours minimum:  Change oxygen saturation probe site  4. Every 4-6 hours:  If on nasal continuous positive airway pressure, respiratory therapy assess nares and determine need for appliance change or resting period. Outcome: Progressing     Problem: Safety - Adult  Goal: Free from fall injury  Outcome: Progressing     Problem: ABCDS Injury Assessment  Goal: Absence of physical injury  Outcome: Progressing     Problem: Respiratory - Adult  Goal: Achieves optimal ventilation and oxygenation  9/21/2023 1849 by Spike Ramirez RN  Outcome: Progressing  9/21/2023 0832 by Benton Herrera RCP  Outcome: Progressing     Problem: Nutrition Deficit:  Goal: Optimize nutritional status  Outcome: Progressing     Problem: Discharge Planning  Goal: Discharge to home or other facility with appropriate resources  Outcome: Progressing     Problem: Pain  Goal: Verbalizes/displays adequate comfort level or baseline comfort level  Outcome: Progressing     Problem: Skin/Tissue Integrity  Goal: Absence of new skin breakdown  Description: 1. Monitor for areas of redness and/or skin breakdown  2. Assess vascular access sites hourly  3. Every 4-6 hours minimum:  Change oxygen saturation probe site  4. Every 4-6 hours:  If on nasal continuous positive airway pressure, respiratory therapy assess nares and determine need for appliance change or resting period.   Outcome: Progressing     Problem: Safety - Adult  Goal: Free from fall injury  Outcome: Progressing     Problem: ABCDS Injury Assessment  Goal: Absence of physical injury  Outcome: Progressing     Problem: Respiratory - Adult  Goal: Achieves optimal ventilation and oxygenation  9/21/2023 1849 by Dawit Mares RN  Outcome: Progressing  9/21/2023 0832 by Viviana Guzmán RCP  Outcome: Progressing     Problem: Nutrition Deficit:  Goal: Optimize nutritional status  Outcome: Progressing

## 2023-09-21 NOTE — PLAN OF CARE
Problem: Discharge Planning  Goal: Discharge to home or other facility with appropriate resources  9/20/2023 2349 by Bessie Chan RN  Outcome: Progressing  9/20/2023 1736 by Tanesha Dias RN  Outcome: Progressing     Problem: Pain  Goal: Verbalizes/displays adequate comfort level or baseline comfort level  9/20/2023 2349 by Bessie Chan RN  Outcome: Progressing  9/20/2023 1736 by Tanesha Dias RN  Outcome: Progressing     Problem: Skin/Tissue Integrity  Goal: Absence of new skin breakdown  Description: 1. Monitor for areas of redness and/or skin breakdown  2. Assess vascular access sites hourly  3. Every 4-6 hours minimum:  Change oxygen saturation probe site  4. Every 4-6 hours:  If on nasal continuous positive airway pressure, respiratory therapy assess nares and determine need for appliance change or resting period.   9/20/2023 2349 by Bessie Chan RN  Outcome: Progressing  9/20/2023 1736 by Tanesha Dias RN  Outcome: Progressing     Problem: Safety - Adult  Goal: Free from fall injury  9/20/2023 2349 by Bessie Chan RN  Outcome: Progressing  Flowsheets (Taken 9/20/2023 2100)  Free From Fall Injury: Instruct family/caregiver on patient safety  9/20/2023 1736 by Tanesha Dias RN  Outcome: Progressing     Problem: ABCDS Injury Assessment  Goal: Absence of physical injury  9/20/2023 2349 by Bessie Chan RN  Outcome: Progressing  9/20/2023 1736 by Tanesha Dias RN  Outcome: Progressing     Problem: Respiratory - Adult  Goal: Achieves optimal ventilation and oxygenation  9/20/2023 2349 by Bessie Chan RN  Outcome: Progressing  9/20/2023 2041 by Eulalio Mejia RCP  Outcome: Progressing  9/20/2023 1736 by Tanesha Dias RN  Outcome: Progressing     Problem: Nutrition Deficit:  Goal: Optimize nutritional status  9/20/2023 2349 by Bessie Chan RN  Outcome: Progressing  9/20/2023 1736 by Tanesha Dias RN  Outcome: Progressing

## 2023-09-22 ENCOUNTER — APPOINTMENT (OUTPATIENT)
Dept: GENERAL RADIOLOGY | Age: 48
DRG: 364 | End: 2023-09-22
Payer: MEDICAID

## 2023-09-22 ENCOUNTER — APPOINTMENT (OUTPATIENT)
Dept: CT IMAGING | Age: 48
DRG: 364 | End: 2023-09-22
Payer: MEDICAID

## 2023-09-22 VITALS
DIASTOLIC BLOOD PRESSURE: 81 MMHG | RESPIRATION RATE: 24 BRPM | BODY MASS INDEX: 14.09 KG/M2 | WEIGHT: 104 LBS | HEART RATE: 105 BPM | OXYGEN SATURATION: 97 % | TEMPERATURE: 97.2 F | HEIGHT: 72 IN | SYSTOLIC BLOOD PRESSURE: 111 MMHG

## 2023-09-22 PROBLEM — D72.823 LEUKEMOID REACTION: Status: RESOLVED | Noted: 2023-09-12 | Resolved: 2023-09-22

## 2023-09-22 PROBLEM — E87.6 HYPOKALEMIA: Status: RESOLVED | Noted: 2023-09-12 | Resolved: 2023-09-22

## 2023-09-22 PROBLEM — L02.11 NECK ABSCESS: Status: RESOLVED | Noted: 2023-09-12 | Resolved: 2023-09-22

## 2023-09-22 PROBLEM — D72.825 BANDEMIA: Status: RESOLVED | Noted: 2023-09-12 | Resolved: 2023-09-22

## 2023-09-22 PROBLEM — K04.7 TOOTH ABSCESS: Status: RESOLVED | Noted: 2023-09-12 | Resolved: 2023-09-22

## 2023-09-22 PROCEDURE — 6370000000 HC RX 637 (ALT 250 FOR IP): Performed by: INTERNAL MEDICINE

## 2023-09-22 PROCEDURE — 71250 CT THORAX DX C-: CPT

## 2023-09-22 PROCEDURE — 6370000000 HC RX 637 (ALT 250 FOR IP)

## 2023-09-22 PROCEDURE — 71045 X-RAY EXAM CHEST 1 VIEW: CPT

## 2023-09-22 PROCEDURE — 99233 SBSQ HOSP IP/OBS HIGH 50: CPT | Performed by: INTERNAL MEDICINE

## 2023-09-22 PROCEDURE — 2580000003 HC RX 258

## 2023-09-22 PROCEDURE — 94640 AIRWAY INHALATION TREATMENT: CPT

## 2023-09-22 PROCEDURE — 6360000002 HC RX W HCPCS

## 2023-09-22 RX ORDER — NICOTINE 21 MG/24HR
1 PATCH, TRANSDERMAL 24 HOURS TRANSDERMAL DAILY
Qty: 30 PATCH | Refills: 3 | Status: SHIPPED | OUTPATIENT
Start: 2023-09-23 | End: 2023-09-22 | Stop reason: SDUPTHER

## 2023-09-22 RX ORDER — FOLIC ACID 1 MG/1
1 TABLET ORAL DAILY
Qty: 30 TABLET | Refills: 3 | Status: SHIPPED | OUTPATIENT
Start: 2023-09-23

## 2023-09-22 RX ORDER — BUDESONIDE AND FORMOTEROL FUMARATE DIHYDRATE 160; 4.5 UG/1; UG/1
2 AEROSOL RESPIRATORY (INHALATION) 2 TIMES DAILY
Qty: 30.6 G | Refills: 1 | Status: SHIPPED | OUTPATIENT
Start: 2023-09-22 | End: 2023-09-22 | Stop reason: SDUPTHER

## 2023-09-22 RX ORDER — FOLIC ACID 1 MG/1
1 TABLET ORAL DAILY
Qty: 30 TABLET | Refills: 3 | Status: SHIPPED | OUTPATIENT
Start: 2023-09-23 | End: 2023-09-22 | Stop reason: SDUPTHER

## 2023-09-22 RX ORDER — LANOLIN ALCOHOL/MO/W.PET/CERES
100 CREAM (GRAM) TOPICAL DAILY
Qty: 30 TABLET | Refills: 3 | Status: SHIPPED | OUTPATIENT
Start: 2023-09-23 | End: 2023-09-22 | Stop reason: SDUPTHER

## 2023-09-22 RX ORDER — TIOTROPIUM BROMIDE 18 UG/1
18 CAPSULE ORAL; RESPIRATORY (INHALATION) DAILY
Qty: 90 CAPSULE | Refills: 1 | Status: SHIPPED | OUTPATIENT
Start: 2023-09-22 | End: 2023-09-26 | Stop reason: SDUPTHER

## 2023-09-22 RX ORDER — AMOXICILLIN AND CLAVULANATE POTASSIUM 875; 125 MG/1; MG/1
1 TABLET, FILM COATED ORAL EVERY 12 HOURS SCHEDULED
Qty: 9 TABLET | Refills: 0 | Status: SHIPPED | OUTPATIENT
Start: 2023-09-22 | End: 2023-09-26 | Stop reason: SDUPTHER

## 2023-09-22 RX ORDER — TIOTROPIUM BROMIDE 18 UG/1
18 CAPSULE ORAL; RESPIRATORY (INHALATION) DAILY
Qty: 90 CAPSULE | Refills: 1 | Status: SHIPPED | OUTPATIENT
Start: 2023-09-22 | End: 2023-09-22 | Stop reason: SDUPTHER

## 2023-09-22 RX ORDER — NICOTINE 21 MG/24HR
1 PATCH, TRANSDERMAL 24 HOURS TRANSDERMAL DAILY
Qty: 30 PATCH | Refills: 3 | Status: SHIPPED | OUTPATIENT
Start: 2023-09-23

## 2023-09-22 RX ORDER — LANOLIN ALCOHOL/MO/W.PET/CERES
100 CREAM (GRAM) TOPICAL DAILY
Qty: 30 TABLET | Refills: 3 | Status: SHIPPED | OUTPATIENT
Start: 2023-09-23

## 2023-09-22 RX ORDER — BUDESONIDE AND FORMOTEROL FUMARATE DIHYDRATE 160; 4.5 UG/1; UG/1
2 AEROSOL RESPIRATORY (INHALATION) 2 TIMES DAILY
Qty: 30.6 G | Refills: 1 | Status: SHIPPED | OUTPATIENT
Start: 2023-09-22 | End: 2023-09-26 | Stop reason: SDUPTHER

## 2023-09-22 RX ORDER — AMOXICILLIN AND CLAVULANATE POTASSIUM 875; 125 MG/1; MG/1
1 TABLET, FILM COATED ORAL EVERY 12 HOURS SCHEDULED
Qty: 9 TABLET | Refills: 0 | Status: SHIPPED | OUTPATIENT
Start: 2023-09-22 | End: 2023-09-22 | Stop reason: SDUPTHER

## 2023-09-22 RX ADMIN — IPRATROPIUM BROMIDE AND ALBUTEROL SULFATE 1 DOSE: 2.5; .5 SOLUTION RESPIRATORY (INHALATION) at 07:21

## 2023-09-22 RX ADMIN — ENOXAPARIN SODIUM 30 MG: 100 INJECTION SUBCUTANEOUS at 08:29

## 2023-09-22 RX ADMIN — METOPROLOL TARTRATE 12.5 MG: 25 TABLET ORAL at 08:28

## 2023-09-22 RX ADMIN — Medication 1 TABLET: at 08:27

## 2023-09-22 RX ADMIN — Medication 100 MG: at 08:27

## 2023-09-22 RX ADMIN — AMOXICILLIN AND CLAVULANATE POTASSIUM 1 TABLET: 875; 125 TABLET, FILM COATED ORAL at 08:29

## 2023-09-22 RX ADMIN — FOLIC ACID 1 MG: 1 TABLET ORAL at 08:28

## 2023-09-22 RX ADMIN — SODIUM CHLORIDE, PRESERVATIVE FREE 10 ML: 5 INJECTION INTRAVENOUS at 08:29

## 2023-09-22 NOTE — PLAN OF CARE
Follow up scheduled with Dr Bonnie Lee. Mini atrium placed today. Ok to discharge from a CTS standpoint with appointment next week.

## 2023-09-22 NOTE — PROGRESS NOTES
Pt was discharged home. Discharge instructions reviewed with pt and pt was understanding. Home meds reviewed with pt and pt expressed understanding. Chest tube care was reviewed with pt and pt was sent home with dressing supplies. IV was removed.

## 2023-09-22 NOTE — DISCHARGE INSTRUCTIONS
You were admitted for pneumothorax and neck abscess    Please finish antibiotic course for neck infection - follow up as soon as possible with dental/oral surgery follow-up after discharge home. po augmentin x 1 more week till  9/26  Please start taking symbicort and spiriva inhalers for underlying COPD  Please f/u with dr. Arnav Inman for chest tube and pneumothorax management  Please abstain from smoking     Call 911 anytime you think you may need emergency care. For example, call if:    You have severe trouble breathing. You have severe chest pain. You passed out (lost consciousness). Call your doctor now or seek immediate medical care if:    You have new or worse trouble breathing. You have new pain or your pain gets worse. You cough up blood. Your chest tube comes out or is bent or blocked. You are bleeding through the bandage where the tube was put in. You have symptoms of infection where the tube was put in, such as: Increased pain, swelling, warmth, or redness. Red streaks leading from the area. Pus draining from the area. A fever. Watch closely for changes in your health, and be sure to contact your doctor if:    The skin around the place where the chest tube was put in is red or irritated. You do not get better as expected.

## 2023-09-22 NOTE — CARE COORDINATION
Discharge [address] Case Management Department  Written by: Hitesh Berg RN    Patient Name: Whitney Stanford  Attending Provider: Janneth Cooley MD  Admit Date: 2023 10:56 AM  MRN: [id number]  Account: [de-identified]                     : 1975  Discharge Date: 23      Disposition: home    Met with patient to discuss transitional planning. Plan is home with brother. Patient's family to provide transportation home. Patient has follow up appointment with CT surgery already scheduled for 23 for removal of mini atrium. Patient agreeable to plan.      Hitesh Berg RN

## 2023-09-22 NOTE — PLAN OF CARE
Problem: Discharge Planning  Goal: Discharge to home or other facility with appropriate resources  9/22/2023 0945 by Makenzie Jha RN  Outcome: Progressing  9/21/2023 1947 by Saqib Sue RN  Outcome: Progressing     Problem: Pain  Goal: Verbalizes/displays adequate comfort level or baseline comfort level  9/22/2023 0945 by Makenzie Jha RN  Outcome: Progressing  9/21/2023 1947 by Saqib Sue RN  Outcome: Progressing     Problem: Skin/Tissue Integrity  Goal: Absence of new skin breakdown  Description: 1. Monitor for areas of redness and/or skin breakdown  2. Assess vascular access sites hourly  3. Every 4-6 hours minimum:  Change oxygen saturation probe site  4. Every 4-6 hours:  If on nasal continuous positive airway pressure, respiratory therapy assess nares and determine need for appliance change or resting period.   9/22/2023 0945 by Makenzie Jha RN  Outcome: Progressing  9/21/2023 1947 by Saqib Sue RN  Outcome: Progressing     Problem: Safety - Adult  Goal: Free from fall injury  9/22/2023 0945 by Makenzie Jha RN  Outcome: Progressing  9/21/2023 1947 by Saqib Sue RN  Outcome: Progressing     Problem: ABCDS Injury Assessment  Goal: Absence of physical injury  9/22/2023 0945 by Makenzie Jha RN  Outcome: Progressing  9/21/2023 1947 by Saqib Sue RN  Outcome: Progressing     Problem: Respiratory - Adult  Goal: Achieves optimal ventilation and oxygenation  9/22/2023 0945 by Makenzie Jha RN  Outcome: Progressing  9/22/2023 0724 by Rhonda Najjar, RCP  Outcome: Progressing  9/21/2023 1947 by Saqib Sue RN  Outcome: Progressing     Problem: Nutrition Deficit:  Goal: Optimize nutritional status  9/22/2023 0945 by Makenzie Jha RN  Outcome: Progressing  9/21/2023 1947 by Saqib Sue RN  Outcome: Progressing

## 2023-09-22 NOTE — PROGRESS NOTES
Chest x-ray still shows pneumothorax chest tube is not titling we will get a CT scan of the chest again today while on portable suction. If the chest is or the lung is expanded we will do a talc slurry today. Possibly home with mini atrium still pneumothorax.

## 2023-09-23 NOTE — PROGRESS NOTES
Pt brother called and stated that unable to fill prescriptions due to Dr that wrote prescriptions is not a covered Dr under [facility].  Notified brother that writer would pass to sahil

## 2023-09-25 ENCOUNTER — TELEPHONE (OUTPATIENT)
Dept: CARDIOTHORACIC SURGERY | Age: 48
End: 2023-09-25

## 2023-09-25 NOTE — TELEPHONE ENCOUNTER
----- Message from JOSE ALFREDO Glaser CNP sent at 9/22/2023 12:08 PM EDT -----  Please add patient to Dr Tru Kerr clinic schedule next week at 9/28/2023 at 10:45am for possible chest tube removal.     Thanks

## 2023-09-26 ENCOUNTER — HOSPITAL ENCOUNTER (EMERGENCY)
Age: 48
Discharge: HOME | End: 2023-09-26
Attending: EMERGENCY MEDICINE
Payer: MEDICAID

## 2023-09-26 ENCOUNTER — APPOINTMENT (OUTPATIENT)
Dept: GENERAL RADIOLOGY | Age: 48
End: 2023-09-26
Payer: MEDICAID

## 2023-09-26 ENCOUNTER — APPOINTMENT (OUTPATIENT)
Dept: CT IMAGING | Age: 48
End: 2023-09-26
Payer: MEDICAID

## 2023-09-26 VITALS
DIASTOLIC BLOOD PRESSURE: 80 MMHG | RESPIRATION RATE: 19 BRPM | OXYGEN SATURATION: 96 % | HEART RATE: 92 BPM | TEMPERATURE: 97.6 F | SYSTOLIC BLOOD PRESSURE: 108 MMHG

## 2023-09-26 DIAGNOSIS — R07.9 CHEST PAIN, UNSPECIFIED TYPE: Primary | ICD-10-CM

## 2023-09-26 LAB
ANION GAP SERPL CALCULATED.3IONS-SCNC: 9 MMOL/L (ref 9–17)
BASOPHILS # BLD: 0.04 K/UL (ref 0–0.2)
BASOPHILS NFR BLD: 1 % (ref 0–2)
BUN SERPL-MCNC: 8 MG/DL (ref 6–20)
CALCIUM SERPL-MCNC: 9.2 MG/DL (ref 8.6–10.4)
CHLORIDE SERPL-SCNC: 103 MMOL/L (ref 98–107)
CO2 SERPL-SCNC: 27 MMOL/L (ref 20–31)
CREAT SERPL-MCNC: 0.6 MG/DL (ref 0.7–1.2)
EOSINOPHIL # BLD: 0.59 K/UL (ref 0–0.44)
EOSINOPHILS RELATIVE PERCENT: 9 % (ref 1–4)
ERYTHROCYTE [DISTWIDTH] IN BLOOD BY AUTOMATED COUNT: 12.1 % (ref 11.8–14.4)
GFR SERPL CREATININE-BSD FRML MDRD: >60 ML/MIN/1.73M2
GLUCOSE SERPL-MCNC: 89 MG/DL (ref 70–99)
HCT VFR BLD AUTO: 45.2 % (ref 40.7–50.3)
HGB BLD-MCNC: 15.1 G/DL (ref 13–17)
IMM GRANULOCYTES # BLD AUTO: <0.03 K/UL (ref 0–0.3)
IMM GRANULOCYTES NFR BLD: 0 %
LYMPHOCYTES NFR BLD: 1.45 K/UL (ref 1.1–3.7)
LYMPHOCYTES RELATIVE PERCENT: 22 % (ref 24–43)
MAGNESIUM SERPL-MCNC: 2.2 MG/DL (ref 1.6–2.6)
MCH RBC QN AUTO: 33 PG (ref 25.2–33.5)
MCHC RBC AUTO-ENTMCNC: 33.4 G/DL (ref 28.4–34.8)
MCV RBC AUTO: 98.7 FL (ref 82.6–102.9)
MONOCYTES NFR BLD: 0.53 K/UL (ref 0.1–1.2)
MONOCYTES NFR BLD: 8 % (ref 3–12)
NEUTROPHILS NFR BLD: 60 % (ref 36–65)
NEUTS SEG NFR BLD: 3.94 K/UL (ref 1.5–8.1)
NRBC BLD-RTO: 0 PER 100 WBC
PLATELET # BLD AUTO: 291 K/UL (ref 138–453)
PMV BLD AUTO: 9 FL (ref 8.1–13.5)
POTASSIUM SERPL-SCNC: 3.9 MMOL/L (ref 3.7–5.3)
RBC # BLD AUTO: 4.58 M/UL (ref 4.21–5.77)
SODIUM SERPL-SCNC: 139 MMOL/L (ref 135–144)
TROPONIN I SERPL HS-MCNC: 10 NG/L (ref 0–22)
TROPONIN I SERPL HS-MCNC: 9 NG/L (ref 0–22)
WBC OTHER # BLD: 6.6 K/UL (ref 3.5–11.3)

## 2023-09-26 PROCEDURE — 6370000000 HC RX 637 (ALT 250 FOR IP): Performed by: HEALTH CARE PROVIDER

## 2023-09-26 PROCEDURE — 71046 X-RAY EXAM CHEST 2 VIEWS: CPT

## 2023-09-26 PROCEDURE — 94761 N-INVAS EAR/PLS OXIMETRY MLT: CPT

## 2023-09-26 PROCEDURE — 71260 CT THORAX DX C+: CPT

## 2023-09-26 PROCEDURE — 84484 ASSAY OF TROPONIN QUANT: CPT

## 2023-09-26 PROCEDURE — 94640 AIRWAY INHALATION TREATMENT: CPT

## 2023-09-26 PROCEDURE — 6360000004 HC RX CONTRAST MEDICATION: Performed by: HEALTH CARE PROVIDER

## 2023-09-26 PROCEDURE — 94664 DEMO&/EVAL PT USE INHALER: CPT

## 2023-09-26 PROCEDURE — 99285 EMERGENCY DEPT VISIT HI MDM: CPT

## 2023-09-26 PROCEDURE — 85025 COMPLETE CBC W/AUTO DIFF WBC: CPT

## 2023-09-26 PROCEDURE — 83735 ASSAY OF MAGNESIUM: CPT

## 2023-09-26 PROCEDURE — 93005 ELECTROCARDIOGRAM TRACING: CPT | Performed by: HEALTH CARE PROVIDER

## 2023-09-26 PROCEDURE — 80048 BASIC METABOLIC PNL TOTAL CA: CPT

## 2023-09-26 RX ORDER — TIOTROPIUM BROMIDE 18 UG/1
18 CAPSULE ORAL; RESPIRATORY (INHALATION) DAILY
Qty: 90 CAPSULE | Refills: 1 | Status: SHIPPED | OUTPATIENT
Start: 2023-09-26

## 2023-09-26 RX ORDER — BUDESONIDE AND FORMOTEROL FUMARATE DIHYDRATE 160; 4.5 UG/1; UG/1
2 AEROSOL RESPIRATORY (INHALATION) 2 TIMES DAILY
Qty: 30.6 G | Refills: 1 | Status: SHIPPED | OUTPATIENT
Start: 2023-09-26

## 2023-09-26 RX ORDER — AMOXICILLIN AND CLAVULANATE POTASSIUM 875; 125 MG/1; MG/1
1 TABLET, FILM COATED ORAL EVERY 12 HOURS SCHEDULED
Qty: 9 TABLET | Refills: 0 | Status: SHIPPED | OUTPATIENT
Start: 2023-09-26 | End: 2023-10-01

## 2023-09-26 RX ORDER — IPRATROPIUM BROMIDE AND ALBUTEROL SULFATE 2.5; .5 MG/3ML; MG/3ML
1 SOLUTION RESPIRATORY (INHALATION) EVERY 4 HOURS PRN
Status: DISCONTINUED | OUTPATIENT
Start: 2023-09-26 | End: 2023-09-26 | Stop reason: HOSPADM

## 2023-09-26 RX ADMIN — IOPAMIDOL 75 ML: 755 INJECTION, SOLUTION INTRAVENOUS at 14:36

## 2023-09-26 RX ADMIN — IPRATROPIUM BROMIDE AND ALBUTEROL SULFATE 1 DOSE: 2.5; .5 SOLUTION RESPIRATORY (INHALATION) at 14:07

## 2023-09-26 ASSESSMENT — LIFESTYLE VARIABLES
HOW OFTEN DO YOU HAVE A DRINK CONTAINING ALCOHOL: NEVER
HOW MANY STANDARD DRINKS CONTAINING ALCOHOL DO YOU HAVE ON A TYPICAL DAY: PATIENT DOES NOT DRINK

## 2023-09-26 ASSESSMENT — ENCOUNTER SYMPTOMS
SORE THROAT: 0
SHORTNESS OF BREATH: 0
ABDOMINAL PAIN: 0
NAUSEA: 0
CONSTIPATION: 0
VOMITING: 0
DIARRHEA: 0

## 2023-09-26 NOTE — ED PROVIDER NOTES
This patient was signed out to me by Dr. Declan Molina at the completion of his shift. Patient with pertinent recent medical history of spontaneous right PTX, with pigtail catheter in place, now with chest pain. W/u in progress. CTPA pending. Labs ok, CXR ok(cath in place). Disposition to be determined. Jaime Gonzalez MD  09/26/23 145    Repeat troponin is 9. I have reviewed the radiologist interpretation of the patient's CTPA and is negative for pulmonary embolus. I been informed that the patient is unable to fill the prescriptions which he was discharged with several days ago due to insurance issues. We will ask  if they can be of any help in this regard.        Jaime Gonzalez MD  09/26/23 2823

## 2023-09-26 NOTE — DISCHARGE INSTRUCTIONS
Please come back tomorrow to  your medications. We will have them filled and ready for you. Take your medication as indicated. For pain use ibuprofen (Motrin / Advil) or acetaminophen (Tylenol), unless prescribed medications that have acetaminophen in it. You can take over the counter acetaminophen tablets (1 - 2 tablets of the 500-mg strength every 6 hours) or ibuprofen tablets (2 tablets every 4 hours). If you have not had a stress test in over a year your primary care physician may order this test as further work-up for your chest pain. If you have a cardiologist, then you should also call them to discuss further treatment options. PLEASE RETURN TO THE EMERGENCY DEPARTMENT IMMEDIATELY for worsening symptoms of increasing pain, shortness of breath, feeling of your heart fluttering or racing, swelling to your feet, unable to lay flat, or if you develop any concerning symptoms such as: high fever not relieved by acetaminophen (Tylenol) and/or ibuprofen (Motrin / Advil), chills, persistent nausea and/or vomiting, loss of consciousness, numbness, weakness or tingling in the arms or legs or change in color of the extremities, changes in mental status, persistent headache, blurry vision, loss of bladder / bowel control, unable to follow up with your physician, or other any other care or concern.

## 2023-09-26 NOTE — ED PROVIDER NOTES
[facility] ED  Emergency Department Encounter  Emergency Medicine Resident     Pt Name:Des Almodovar  MRN: [id number]  [address] [facility] 1975  Date of evaluation: 9/26/23  PCP:  No primary care provider on file. Note Started: 12:38 PM EDT      CHIEF COMPLAINT       Chief Complaint   Patient presents with    Shortness of Breath    Chest Pain       HISTORY OF PRESENT ILLNESS  (Location/Symptom, Timing/Onset, Context/Setting, Quality, Duration, Modifying Factors, Severity.)      Kwaku Tong is a 51 y.o. male who presents with chest pain and shortness of breath. Sharp left-sided chest pain since this morning with some associated increased difficulty breathing. Patient states pain is nonradiating. Discharged on 9/22/23 after being admitted for evaluation of a submandibular abscess, spontaneous pneumothorax, aspiration pneumonia. Unable to  rx 2/2 insurance issues. Denies any other fevers, chills, abdominal pain, nausea vomiting, blood extremity numbness weakness or paresthesias. PAST MEDICAL / SURGICAL / SOCIAL / FAMILY HISTORY      has a past medical history of COPD (chronic obstructive pulmonary disease) ([address]) and Hypertension. has a past surgical history that includes Incision and Drainage of Neck Abscess (09/12/2023); Neck surgery (Bilateral, 9/12/2023); and IR CHEST TUBE INSERTION (9/14/2023).       Social History     Socioeconomic History    Marital status: Single     Spouse name: Not on file    Number of children: Not on file    Years of education: Not on file    Highest education level: Not on file   Occupational History    Not on file   Tobacco Use    Smoking status: Every Day     Packs/day: .5     Types: Cigarettes    Smokeless tobacco: Never   Substance and Sexual Activity    Alcohol use: Not Currently     Comment: 4 12 oz beers daily    Drug use: Yes     Frequency: 7.0 times per week     Types: Marijuana Werner Aguila)    Sexual activity: Not on file   Other Topics Concern

## 2023-09-26 NOTE — ED NOTES
Writer met with patient at bedside regarding concerns related to filling his medications. Patient has [facility] and therefore is unable to obtain his medications in [place]. Patient reports that after his recent discharge from this hospital he attempted to fill his medications at a [place] pharmacy but was denied as the physician did not have prescribing privileges in [place]. Writer did [employer] in [place], who confirms this is accurate. Writer reached out to the prior authorization number for [facility] who states that in order for medications to be filled they need an instate presciber and an instate pharmacy. To avoid another admission, Claudeen Musty was given permission to med assist medication to hospital pharmacy for  tomorrow. Patient informed that this voucher is for one time only. Patient was educated on importance of changing his medicaid to [place] if his intentions are to remain in this state. Additionally, until Medicaid is updated patient is encouraged to seek medical care in [place] to avoid lapse in medication coverage. He reports understanding, denies further social work needs.      Michael Summerss, [address] [place] [address]  09/26/23 7606

## 2023-09-27 LAB
EKG ATRIAL RATE: 90 BPM
EKG P AXIS: 72 DEGREES
EKG P-R INTERVAL: 138 MS
EKG Q-T INTERVAL: 390 MS
EKG QRS DURATION: 84 MS
EKG QTC CALCULATION (BAZETT): 474 MS
EKG R AXIS: 82 DEGREES
EKG T AXIS: 82 DEGREES
EKG VENTRICULAR RATE: 89 BPM

## 2023-09-27 NOTE — ED PROVIDER NOTES
[facility] ED  Emergency Department  Emergency Medicine Resident Sign-out     Care of Marcy Mccracken was assumed from Dr. Milo Meléndez and is being seen for Shortness of Breath and Chest Pain  . The patient's initial evaluation and plan have been discussed with the prior provider who initially evaluated the patient. EMERGENCY DEPARTMENT COURSE / MEDICAL DECISION MAKING:       MEDICATIONS GIVEN:  Orders Placed This Encounter   Medications    DISCONTD: ipratropium 0.5 mg-albuterol 2.5 mg (DUONEB) nebulizer solution 1 Dose     Order Specific Question:   Initiate RT Bronchodilator Protocol     Answer:   Yes - Inpatient Protocol    iopamidol (ISOVUE-370) 76 % injection 75 mL    amoxicillin-clavulanate (AUGMENTIN) 875-125 MG per tablet     Sig: Take 1 tablet by mouth every 12 hours for 9 doses     Dispense:  9 tablet     Refill:  0    budesonide-formoterol (SYMBICORT) 160-4.5 MCG/ACT AERO     Sig: Inhale 2 puffs into the lungs 2 times daily     Dispense:  30.6 g     Refill:  1    tiotropium (SPIRIVA HANDIHALER) 18 MCG inhalation capsule     Sig: Inhale 1 capsule into the lungs daily     Dispense:  90 capsule     Refill:  1       LABS / RADIOLOGY:     Labs Reviewed   CBC WITH AUTO DIFFERENTIAL - Abnormal; Notable for the following components:       Result Value    Lymphocytes % 22 (*)     Eosinophils % 9 (*)     Eosinophils Absolute 0.59 (*)     All other components within normal limits   BASIC METABOLIC PANEL - Abnormal; Notable for the following components:    Creatinine 0.6 (*)     All other components within normal limits   TROPONIN   MAGNESIUM   TROPONIN       XR CHEST (2 VW)    Result Date: 9/26/2023  EXAMINATION: TWO XRAY VIEWS OF THE CHEST 9/26/2023 1:35 pm COMPARISON: September 22, 2023 HISTORY: ORDERING SYSTEM PROVIDED HISTORY: chest pain/SOB TECHNOLOGIST PROVIDED HISTORY: chest pain/SOB FINDINGS: Redemonstration of right chest tube.   In spite of the chest tube suggestion of right apical and

## 2023-09-28 ENCOUNTER — HOSPITAL ENCOUNTER (OUTPATIENT)
Age: 48
Discharge: HOME | End: 2023-09-30
Payer: MEDICAID

## 2023-09-28 ENCOUNTER — OFFICE VISIT (OUTPATIENT)
Dept: CARDIOTHORACIC SURGERY | Age: 48
End: 2023-09-28
Payer: MEDICAID

## 2023-09-28 ENCOUNTER — HOSPITAL ENCOUNTER (OUTPATIENT)
Dept: GENERAL RADIOLOGY | Age: 48
Discharge: HOME | End: 2023-09-30
Payer: MEDICAID

## 2023-09-28 VITALS
OXYGEN SATURATION: 94 % | HEIGHT: 72 IN | BODY MASS INDEX: 14.49 KG/M2 | SYSTOLIC BLOOD PRESSURE: 105 MMHG | WEIGHT: 107 LBS | HEART RATE: 100 BPM | DIASTOLIC BLOOD PRESSURE: 78 MMHG | RESPIRATION RATE: 16 BRPM | TEMPERATURE: 98.1 F

## 2023-09-28 DIAGNOSIS — J93.0 SPONTANEOUS TENSION PNEUMOTHORAX: Primary | ICD-10-CM

## 2023-09-28 DIAGNOSIS — J93.81 CHRONIC PNEUMOTHORAX: Primary | ICD-10-CM

## 2023-09-28 DIAGNOSIS — J93.81 CHRONIC PNEUMOTHORAX: ICD-10-CM

## 2023-09-28 DIAGNOSIS — J93.11 PRIMARY SPONTANEOUS PNEUMOTHORAX: ICD-10-CM

## 2023-09-28 PROCEDURE — 1036F TOBACCO NON-USER: CPT | Performed by: THORACIC SURGERY (CARDIOTHORACIC VASCULAR SURGERY)

## 2023-09-28 PROCEDURE — 71046 X-RAY EXAM CHEST 2 VIEWS: CPT

## 2023-09-28 PROCEDURE — 3074F SYST BP LT 130 MM HG: CPT | Performed by: THORACIC SURGERY (CARDIOTHORACIC VASCULAR SURGERY)

## 2023-09-28 PROCEDURE — 1111F DSCHRG MED/CURRENT MED MERGE: CPT | Performed by: THORACIC SURGERY (CARDIOTHORACIC VASCULAR SURGERY)

## 2023-09-28 PROCEDURE — 3078F DIAST BP <80 MM HG: CPT | Performed by: THORACIC SURGERY (CARDIOTHORACIC VASCULAR SURGERY)

## 2023-09-28 PROCEDURE — 99213 OFFICE O/P EST LOW 20 MIN: CPT | Performed by: THORACIC SURGERY (CARDIOTHORACIC VASCULAR SURGERY)

## 2023-09-28 PROCEDURE — G8419 CALC BMI OUT NRM PARAM NOF/U: HCPCS | Performed by: THORACIC SURGERY (CARDIOTHORACIC VASCULAR SURGERY)

## 2023-09-28 PROCEDURE — G8427 DOCREV CUR MEDS BY ELIG CLIN: HCPCS | Performed by: THORACIC SURGERY (CARDIOTHORACIC VASCULAR SURGERY)

## 2023-09-28 NOTE — PROGRESS NOTES
mouth 2 times daily, Disp: , Rfl:     Past Surgical History:   Procedure Laterality Date    INCISION AND DRAINAGE OF NECK ABSCESS  09/12/2023    IR CHEST TUBE INSERTION  9/14/2023    IR CHEST TUBE INSERTION 9/14/2023 Filiberto Meeks MD [facility]    NECK SURGERY Bilateral 9/12/2023    I AND D NECK ABSCESS performed by France Somers MD at [facility] Hx: reports that he quit smoking 10 days ago. His smoking use included cigarettes. He smoked an average of .5 packs per day. He has never used smokeless tobacco.    Assessment & Plan:   Remove chest tube in office with no complications. Patient will follow-up within the next 24 hours for a repeat chest x-ray after chest tube removal.  He will call cardiothoracic surgery if there is any onset of chest pain or shortness of breath. He will be getting a chest x-ray later today or tomorrow morning depends on his transportation.     [address] [facility], APRN - NP,APRN CNP

## 2023-09-29 ENCOUNTER — HOSPITAL ENCOUNTER (OUTPATIENT)
Dept: GENERAL RADIOLOGY | Age: 48
End: 2023-09-29
Payer: MEDICAID

## 2023-09-29 ENCOUNTER — HOSPITAL ENCOUNTER (OUTPATIENT)
Age: 48
End: 2023-09-29
Payer: MEDICAID

## 2023-09-29 DIAGNOSIS — J93.11 PRIMARY SPONTANEOUS PNEUMOTHORAX: ICD-10-CM

## 2023-09-29 PROCEDURE — 71045 X-RAY EXAM CHEST 1 VIEW: CPT

## (undated) DEVICE — GOWN,AURORA,NONREINFORCED,LARGE: Brand: MEDLINE

## (undated) DEVICE — ELECTRODE ELECSURG NDL 2.8 INX7.2 CM COAT INSUL EDGE

## (undated) DEVICE — GLOVE ORANGE PI 7 1/2   MSG9075

## (undated) DEVICE — CONTAINER,SPECIMEN,4OZ,OR STRL: Brand: MEDLINE

## (undated) DEVICE — SVMMC HD AND NK PK

## (undated) DEVICE — CORD ES L12FT BPLR FRCP

## (undated) DEVICE — STRIP SKIN CLSR W0.25XL4IN WHT SPUNBOUND FBR NYL HI ADH

## (undated) DEVICE — SUTURE VCRL + SZ 3-0 L27IN ABSRB UD L26MM SH 1/2 CIR VCP416H

## (undated) DEVICE — TUBING SUCT 12FR MAL ALUM SHFT FN CAP VENT UNIV CONN W/ OBT

## (undated) DEVICE — STRAP,POSITIONING,KNEE/BODY,FOAM,4X60": Brand: MEDLINE

## (undated) DEVICE — GARMENT,MEDLINE,DVT,INT,CALF,MED, GEN2: Brand: MEDLINE

## (undated) DEVICE — SUTURE ETHLN SZ 3-0 L18IN NONABSORBABLE BLK L24MM FS-1 3/8 663G